# Patient Record
Sex: FEMALE | Race: WHITE | Employment: PART TIME | URBAN - NONMETROPOLITAN AREA
[De-identification: names, ages, dates, MRNs, and addresses within clinical notes are randomized per-mention and may not be internally consistent; named-entity substitution may affect disease eponyms.]

---
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
## 2022-01-14 ENCOUNTER — OFFICE VISIT (OUTPATIENT)
Dept: FAMILY MEDICINE CLINIC | Age: 22
End: 2022-01-14
Payer: MEDICARE

## 2022-01-14 VITALS
OXYGEN SATURATION: 99 % | WEIGHT: 104.4 LBS | RESPIRATION RATE: 14 BRPM | BODY MASS INDEX: 17.4 KG/M2 | DIASTOLIC BLOOD PRESSURE: 62 MMHG | HEART RATE: 90 BPM | TEMPERATURE: 97 F | SYSTOLIC BLOOD PRESSURE: 94 MMHG | HEIGHT: 65 IN

## 2022-01-14 DIAGNOSIS — Z11.4 ENCOUNTER FOR SCREENING FOR HIV: ICD-10-CM

## 2022-01-14 DIAGNOSIS — R59.0 ENLARGED LYMPH NODES IN ARMPIT: ICD-10-CM

## 2022-01-14 DIAGNOSIS — Z11.59 ENCOUNTER FOR HEPATITIS C SCREENING TEST FOR LOW RISK PATIENT: ICD-10-CM

## 2022-01-14 DIAGNOSIS — B35.1 ONYCHOMYCOSIS OF GREAT TOE: Primary | ICD-10-CM

## 2022-01-14 PROCEDURE — 1036F TOBACCO NON-USER: CPT | Performed by: STUDENT IN AN ORGANIZED HEALTH CARE EDUCATION/TRAINING PROGRAM

## 2022-01-14 PROCEDURE — G8419 CALC BMI OUT NRM PARAM NOF/U: HCPCS | Performed by: STUDENT IN AN ORGANIZED HEALTH CARE EDUCATION/TRAINING PROGRAM

## 2022-01-14 PROCEDURE — 99204 OFFICE O/P NEW MOD 45 MIN: CPT | Performed by: STUDENT IN AN ORGANIZED HEALTH CARE EDUCATION/TRAINING PROGRAM

## 2022-01-14 PROCEDURE — G8484 FLU IMMUNIZE NO ADMIN: HCPCS | Performed by: STUDENT IN AN ORGANIZED HEALTH CARE EDUCATION/TRAINING PROGRAM

## 2022-01-14 PROCEDURE — G8427 DOCREV CUR MEDS BY ELIG CLIN: HCPCS | Performed by: STUDENT IN AN ORGANIZED HEALTH CARE EDUCATION/TRAINING PROGRAM

## 2022-01-14 RX ORDER — TERBINAFINE HYDROCHLORIDE 250 MG/1
250 TABLET ORAL DAILY
Qty: 84 TABLET | Refills: 0 | Status: SHIPPED | OUTPATIENT
Start: 2022-01-14 | End: 2022-04-08

## 2022-01-14 SDOH — ECONOMIC STABILITY: FOOD INSECURITY: WITHIN THE PAST 12 MONTHS, THE FOOD YOU BOUGHT JUST DIDN'T LAST AND YOU DIDN'T HAVE MONEY TO GET MORE.: NEVER TRUE

## 2022-01-14 SDOH — ECONOMIC STABILITY: FOOD INSECURITY: WITHIN THE PAST 12 MONTHS, YOU WORRIED THAT YOUR FOOD WOULD RUN OUT BEFORE YOU GOT MONEY TO BUY MORE.: NEVER TRUE

## 2022-01-14 ASSESSMENT — PATIENT HEALTH QUESTIONNAIRE - PHQ9
SUM OF ALL RESPONSES TO PHQ QUESTIONS 1-9: 1
2. FEELING DOWN, DEPRESSED OR HOPELESS: 0
1. LITTLE INTEREST OR PLEASURE IN DOING THINGS: 1
SUM OF ALL RESPONSES TO PHQ9 QUESTIONS 1 & 2: 1
SUM OF ALL RESPONSES TO PHQ QUESTIONS 1-9: 1

## 2022-01-14 ASSESSMENT — SOCIAL DETERMINANTS OF HEALTH (SDOH): HOW HARD IS IT FOR YOU TO PAY FOR THE VERY BASICS LIKE FOOD, HOUSING, MEDICAL CARE, AND HEATING?: NOT VERY HARD

## 2022-01-14 ASSESSMENT — ENCOUNTER SYMPTOMS
DIARRHEA: 0
VOMITING: 0
ABDOMINAL PAIN: 0
WHEEZING: 0
SHORTNESS OF BREATH: 0
CONSTIPATION: 0
COUGH: 0
NAUSEA: 0
BACK PAIN: 0

## 2022-01-14 NOTE — PROGRESS NOTES
Attending attestation:  I personally performed and participated key or critical portions of the evaluation and management including personally performing the exam and medical decision making. I verify the accuracy of the documentation by the resident with the following addition or changes: Here for onychomycosis. Has not tried anything. Also had a transient breast lump that went away. Exam is normal today. Will get COVID-19 vaccine. Will get vaccine records for us. Will treat empirically with Lamisil.         Electronically signed by Bridget Cabrera MD on 1/14/2022 at 11:48 AM

## 2022-01-14 NOTE — PROGRESS NOTES
Sagrario Costello is a 25 y.o. female who presents today for:  Chief Complaint   Patient presents with    New Patient     establish         HPI:   Sagrario Costello is 25 y.o. who presents today to establish care. Wondering about the Covid vaccine. She has not gotten this, but is interested in doing so before upcoming trip. Reports lump in her right breast near arm pit several weeks ago. This has now resolved. Reports some caffeine intake daily. Family history of breast cancer in her great aunt only. She denies tenderness, skin changes, or a growing mass in her breast. Reports no further issues since this resolved. Bilateral big toes appear thickened and are not growing x5 months. She has not tried anything for this. Not improving or worsening. Wondering what can be done. Patient is unsure of childhood immunizations, she will work to get shot records. Reports occasional alcohol use and marijuana use. Has used acid in the past, but had a \"bad trip\" and has not used since this. Objective:     Vitals:    01/14/22 1048   BP: 94/62   Site: Left Upper Arm   Position: Sitting   Cuff Size: Medium Adult   Pulse: 90   Resp: 14   Temp: 97 °F (36.1 °C)   TempSrc: Skin   SpO2: 99%   Weight: 104 lb 6.4 oz (47.4 kg)   Height: 5' 5\" (1.651 m)       Wt Readings from Last 3 Encounters:   01/14/22 104 lb 6.4 oz (47.4 kg) (8 %, Z= -1.40)*   12/18/21 102 lb (46.3 kg) (6 %, Z= -1.60)*     * Growth percentiles are based on CDC (Girls, 2-20 Years) data.        BP Readings from Last 3 Encounters:   01/14/22 94/62   12/18/21 109/70       No results found for: WBC, HGB, HCT, MCV, PLT  No results found for: NA, K, CL, CO2, BUN, CREATININE, GLUCOSE, CALCIUM, PROT, LABALBU, BILITOT, ALKPHOS, AST, ALT, LABGLOM, GFRAA, AGRATIO, GLOB  No results found for: TSH, N0VNSQI, V6OFLYF, THYROIDAB, FT3, T4FREE  No results found for: LABA1C  No results found for: EAG  No results found for: CHOL  No results found for: TRIG  No results found for: HDL  No results found for: LDLCHOLESTEROL, LDLCALC    No results found for: LABMICR, [id number]    Review of Systems   Constitutional: Negative for activity change, chills, fatigue and fever. HENT: Negative for congestion. Eyes: Negative for visual disturbance. Respiratory: Negative for cough, shortness of breath and wheezing. Cardiovascular: Negative for chest pain and palpitations. Gastrointestinal: Negative for abdominal pain, constipation, diarrhea, nausea and vomiting. Genitourinary: Negative for dysuria and menstrual problem. Musculoskeletal: Negative for back pain. Skin:        Nail changes to bilateral big toes   Neurological: Negative for dizziness, syncope, light-headedness and headaches. Psychiatric/Behavioral: Negative for dysphoric mood. The patient is not nervous/anxious. Physical Exam  Vitals and nursing note reviewed. Constitutional:       Appearance: Normal appearance. She is normal weight. HENT:      Head: Normocephalic and atraumatic. Right Ear: Tympanic membrane and ear canal normal.      Left Ear: Tympanic membrane and ear canal normal.      Nose: Nose normal.      Mouth/Throat:      Mouth: Mucous membranes are moist.      Pharynx: No oropharyngeal exudate or posterior oropharyngeal erythema. Eyes:      Extraocular Movements: Extraocular movements intact. Conjunctiva/sclera: Conjunctivae normal.      Pupils: Pupils are equal, round, and reactive to light. Cardiovascular:      Rate and Rhythm: Normal rate and regular rhythm. Pulses: Normal pulses. Heart sounds: No murmur heard. Pulmonary:      Effort: Pulmonary effort is normal. No respiratory distress. Breath sounds: Normal breath sounds. No wheezing. Abdominal:      General: Abdomen is flat. Bowel sounds are normal. There is no distension. Palpations: Abdomen is soft. There is no mass. Tenderness: There is no abdominal tenderness.    Musculoskeletal:      Cervical back: Neck supple. Skin:     General: Skin is warm and dry. Comments: Bilateral great toes appear thickened, consistent with onchomycosis   Neurological:      General: No focal deficit present. Mental Status: She is alert and oriented to person, place, and time. Psychiatric:         Mood and Affect: Mood normal.         Behavior: Behavior normal.      Comments: Somewhat guarded affect           There is no immunization history on file for this patient. Health Maintenance Due   Topic Date Due    Hepatitis C screen  Never done    Varicella vaccine (1 of 2 - 2-dose childhood series) Never done    COVID-19 Vaccine (1) Never done    HPV vaccine (1 - 2-dose series) Never done    Depression Screen  Never done    HIV screen  Never done    Chlamydia screen  Never done    Flu vaccine (1) Never done    DTaP/Tdap/Td vaccine (1 - Tdap) Never done          Food Insecurity: No Food Insecurity    Worried About Running Out of Food in the Last Year: Never true    Ran Out of Food in the Last Year: Never true       Assessment / Plan:   1. Onychomycosis of great toe, bilateral  Will treat with oral terbinafine. Discussed checking LFTs after 6 weeks. - Hepatic Function Panel; Future  - terbinafine (LAMISIL) 250 MG tablet; Take 1 tablet by mouth daily  Dispense: 84 tablet; Refill: 0    2. Enlarged lymph nodes in armpit  Most likely related to now resolved lesion patient felt on lateral breast/axilla. Now resolved. No abnormal findings on exam.  - Continue to monitor, consider US if reappears in future    3. Encounter for screening for HIV  - HIV-1 and HIV-2 Antibodies; Future    4. Encounter for hepatitis C screening test for low risk patient  - Hepatitis C Antibody; Future         Return in about 6 months (around 7/14/2022).           Medications Prescribed:  Orders Placed This Encounter   Medications    terbinafine (LAMISIL) 250 MG tablet     Sig: Take 1 tablet by mouth daily     Dispense:  84 tablet Add 52 Modifier (Optional): no Refill:  0       No future appointments. Patient given educational materials - see patient instructions. Discussed use, benefit, and sideeffects of prescribed medications. All patient questions answered. Pt voiced understanding. Reviewed health maintenance. Instructed to continue current medications, diet and exercise. Patient agreed with treatment plan. Follow up as directed.      Electronically signed by Tamar Powell MD on 1/14/2022 at 12:12 PM Duration Of Freeze Thaw-Cycle (Seconds): 5 Detail Level: Simple Bill Insurance (You Assume Risk Of Denial Or Audit By Selecting Yes): Yes Post-Care Instructions: I reviewed with the patient in detail post-care instructions. Patient is to wear sunprotection, and avoid picking at any of the treated lesions. Pt may apply Vaseline to crusted or scabbing areas. Medical Necessity Clause: This procedure was medically necessary because the lesions that were treated were: Number Of Freeze-Thaw Cycles: 2 freeze-thaw cycles Medical Necessity Information: It is in your best interest to select a reason for this procedure from the list below. All of these items fulfill various CMS LCD requirements except the new and changing color options. Consent: The patient's verbal consent was obtained including but not limited to risks of crusting, scabbing, blistering, scarring, darker or lighter pigmentary change, recurrence, incomplete removal and infection.

## 2022-01-14 NOTE — PROGRESS NOTES
Health Maintenance Due   Topic Date Due    Hepatitis C screen  Never done    Varicella vaccine (1 of 2 - 2-dose childhood series) Never done    COVID-19 Vaccine (1) Never done    HPV vaccine (1 - 2-dose series) Never done    Depression Screen  Never done    HIV screen  Never done    Chlamydia screen  Never done    Flu vaccine (1) Never done    DTaP/Tdap/Td vaccine (1 - Tdap) Never done

## 2022-03-14 ENCOUNTER — OFFICE VISIT (OUTPATIENT)
Dept: FAMILY MEDICINE CLINIC | Age: 22
End: 2022-03-14
Payer: MEDICARE

## 2022-03-14 VITALS
DIASTOLIC BLOOD PRESSURE: 60 MMHG | OXYGEN SATURATION: 99 % | BODY MASS INDEX: 16.69 KG/M2 | HEART RATE: 96 BPM | RESPIRATION RATE: 18 BRPM | SYSTOLIC BLOOD PRESSURE: 100 MMHG | WEIGHT: 100.2 LBS | HEIGHT: 65 IN | TEMPERATURE: 96.9 F

## 2022-03-14 DIAGNOSIS — L70.0 ACNE VULGARIS: Primary | ICD-10-CM

## 2022-03-14 DIAGNOSIS — B35.1 ONYCHOMYCOSIS OF GREAT TOE: ICD-10-CM

## 2022-03-14 PROCEDURE — G8427 DOCREV CUR MEDS BY ELIG CLIN: HCPCS | Performed by: STUDENT IN AN ORGANIZED HEALTH CARE EDUCATION/TRAINING PROGRAM

## 2022-03-14 PROCEDURE — G8484 FLU IMMUNIZE NO ADMIN: HCPCS | Performed by: STUDENT IN AN ORGANIZED HEALTH CARE EDUCATION/TRAINING PROGRAM

## 2022-03-14 PROCEDURE — 99214 OFFICE O/P EST MOD 30 MIN: CPT | Performed by: STUDENT IN AN ORGANIZED HEALTH CARE EDUCATION/TRAINING PROGRAM

## 2022-03-14 PROCEDURE — G8419 CALC BMI OUT NRM PARAM NOF/U: HCPCS | Performed by: STUDENT IN AN ORGANIZED HEALTH CARE EDUCATION/TRAINING PROGRAM

## 2022-03-14 PROCEDURE — 1036F TOBACCO NON-USER: CPT | Performed by: STUDENT IN AN ORGANIZED HEALTH CARE EDUCATION/TRAINING PROGRAM

## 2022-03-14 RX ORDER — CLINDAMYCIN PHOSPHATE 10 MG/G
GEL TOPICAL
Qty: 60 G | Refills: 2 | Status: SHIPPED | OUTPATIENT
Start: 2022-03-14 | End: 2022-03-21

## 2022-03-14 RX ORDER — METRONIDAZOLE 7.5 MG/G
GEL TOPICAL
Qty: 1 EACH | Refills: 1 | Status: CANCELLED | OUTPATIENT
Start: 2022-03-14

## 2022-03-14 ASSESSMENT — ENCOUNTER SYMPTOMS
SHORTNESS OF BREATH: 0
COUGH: 0
BACK PAIN: 0
DIARRHEA: 0
WHEEZING: 0
VOMITING: 0
NAUSEA: 0
CONSTIPATION: 0
ABDOMINAL PAIN: 0

## 2022-03-14 NOTE — PROGRESS NOTES
Victoria Powell is a 25 y.o. female who presents today for:  Chief Complaint   Patient presents with    Referral - General     wants derm referral         HPI:   Victoria Powell is 25 y.o. who presents today for 2 month follow-up. She was diagnosed with onychomycosis of toenails. She has been taking Lamisil intermittently, but is planning to start taking this consistently again. She has not noticed any side effects. She does have LFTs ordered, but has not yet been able to have these drawn. She presents today for concern of acne. Worsening breakouts over the last few months. She is using OTC washes for this and topical Retinol gel without noticing much improvement. She does have scattered breakouts throughout her face and some on her back occasionally. She was treated with a topical ointment several years ago but does not recall what she used. Breakouts do not seem to worsen around her periods. She is wondering what else she can use for this. Objective:     Vitals:    03/14/22 0814   BP: 100/60   Site: Right Upper Arm   Position: Sitting   Cuff Size: Medium Adult   Pulse: 96   Resp: 18   Temp: 96.9 °F (36.1 °C)   TempSrc: Skin   SpO2: 99%   Weight: 100 lb 3.2 oz (45.5 kg)   Height: 5' 5\" (1.651 m)       Wt Readings from Last 3 Encounters:   03/14/22 100 lb 3.2 oz (45.5 kg) (4 %, Z= -1.78)*   01/14/22 104 lb 6.4 oz (47.4 kg) (8 %, Z= -1.40)*   12/18/21 102 lb (46.3 kg) (6 %, Z= -1.60)*     * Growth percentiles are based on CDC (Girls, 2-20 Years) data.        BP Readings from Last 3 Encounters:   03/14/22 100/60   01/14/22 94/62   12/18/21 109/70       No results found for: WBC, HGB, HCT, MCV, PLT  No results found for: NA, K, CL, CO2, BUN, CREATININE, GLUCOSE, CALCIUM, PROT, LABALBU, BILITOT, ALKPHOS, AST, ALT, LABGLOM, GFRAA, AGRATIO, GLOB  No results found for: TSH, T8LURUK, M7PRVIR, THYROIDAB, FT3, T4FREE  No results found for: LABA1C  No results found for: EAG  No results found for: CHOL  No results found for: TRIG  No results found for: HDL  No results found for: LDLCHOLESTEROL, LDLCALC    No results found for: LABMICR, [id number]    Review of Systems   Constitutional: Negative for chills, fatigue and fever. HENT: Negative for congestion. Eyes: Negative for visual disturbance. Respiratory: Negative for cough, shortness of breath and wheezing. Cardiovascular: Negative for chest pain and palpitations. Gastrointestinal: Negative for abdominal pain, constipation, diarrhea, nausea and vomiting. Musculoskeletal: Negative for back pain. Skin:        Bilateral thickened toenails  Reports Acne to face and back   Neurological: Negative for dizziness, syncope, light-headedness and headaches. Psychiatric/Behavioral: Negative for dysphoric mood. The patient is not nervous/anxious. Physical Exam  Vitals and nursing note reviewed. Constitutional:       Appearance: Normal appearance. She is normal weight. HENT:      Head: Normocephalic and atraumatic. Mouth/Throat:      Mouth: Mucous membranes are moist.      Pharynx: No oropharyngeal exudate or posterior oropharyngeal erythema. Eyes:      Extraocular Movements: Extraocular movements intact. Conjunctiva/sclera: Conjunctivae normal.      Pupils: Pupils are equal, round, and reactive to light. Cardiovascular:      Rate and Rhythm: Normal rate and regular rhythm. Pulses: Normal pulses. Heart sounds: No murmur heard. Pulmonary:      Effort: Pulmonary effort is normal. No respiratory distress. Breath sounds: Normal breath sounds. No wheezing. Abdominal:      General: Abdomen is flat. Bowel sounds are normal. There is no distension. Palpations: Abdomen is soft. There is no mass. Tenderness: There is no abdominal tenderness. Musculoskeletal:      Cervical back: Neck supple. Skin:     General: Skin is warm and dry. Findings: Acne present. Comments:  There are scattered erythematous papules noted across face - especially over bilateral cheeks and forehead. Skin appears mildly oily. Minimal scarring is present on bilateral cheeks from previous acne. No nodular or cystic lesions present. Very few scattered comedones noted across back   Neurological:      General: No focal deficit present. Mental Status: She is alert and oriented to person, place, and time. Psychiatric:         Mood and Affect: Mood normal.         Behavior: Behavior normal.           There is no immunization history on file for this patient. Health Maintenance Due   Topic Date Due    Hepatitis C screen  Never done    Varicella vaccine (2 of 2 - 2-dose childhood series) 10/28/2006    COVID-19 Vaccine (1) Never done    HIV screen  Never done    Chlamydia screen  Never done    Flu vaccine (1) Never done          Food Insecurity: No Food Insecurity    Worried About Running Out of Food in the Last Year: Never true    Ran Out of Food in the Last Year: Never true       Assessment / Plan:   1. Acne vulgaris  Mild-Moderate in severity.  - Encouraged patient to begin using Benzoyl Peroxide face wash and moisturizer OTC - start with daily, increase to twice daily as irritation decreased  - Start topical Clindamycin gel  - Follow-up in 2 months  - Consider referral to dermatology in future per patient's wishes  - clindamycin (CLEOCIN-T) 1 % gel; Apply topically 2 times daily. Dispense: 60 g; Refill: 2    2. Onychomycosis of great toe  Patient continues to undergo treatment with Lamisil - continue treatment  - Encouraged patient to have LFTs drawn prior to next visit         Return in about 2 months (around 5/14/2022). Medications Prescribed:  Orders Placed This Encounter   Medications    clindamycin (CLEOCIN-T) 1 % gel     Sig: Apply topically 2 times daily.      Dispense:  60 g     Refill:  2       Future Appointments   Date Time Provider Raheel Mejia   5/18/2022  1:00 PM Tamar Powell MD [facility] [address] [facility] Patient given educational materials - see patient instructions. Discussed use, benefit, and sideeffects of prescribed medications. All patient questions answered. Pt voiced understanding. Reviewed health maintenance. Instructed to continue current medications, diet and exercise. Patient agreed with treatment plan. Follow up as directed.      Electronically signed by Pio Jj MD on 3/14/2022 at 9:12 AM

## 2022-03-14 NOTE — PROGRESS NOTES
S: 25 y.o. female with   Chief Complaint   Patient presents with    Referral - General     wants derm referral     Acne treatment desires. She is having flare ups that are not responding to otc meds tried. Currently on Retinol OTC. Has had it for years. No hormonal effects/cycling. Not sure of triggers. Mild scarring but not pustular in nature. Great toe antifungal treatment ongoing. BP Readings from Last 3 Encounters:   03/14/22 100/60   01/14/22 94/62   12/18/21 109/70     Wt Readings from Last 3 Encounters:   03/14/22 100 lb 3.2 oz (45.5 kg) (4 %, Z= -1.78)*   01/14/22 104 lb 6.4 oz (47.4 kg) (8 %, Z= -1.40)*   12/18/21 102 lb (46.3 kg) (6 %, Z= -1.60)*     * Growth percentiles are based on [facility] (Girls, 2-20 Years) data. O: VS:   Vitals:    03/14/22 0814   BP: 100/60   Site: Right Upper Arm   Position: Sitting   Cuff Size: Medium Adult   Pulse: 96   Resp: 18   Temp: 96.9 °F (36.1 °C)   TempSrc: Skin   SpO2: 99%   Weight: 100 lb 3.2 oz (45.5 kg)   Height: 5' 5\" (1.651 m)     Body mass index is 16.67 kg/m². AAO/NAD, appropriate affect for mood  Normocephalic, atraumatic, eyes  conjunctiva and sclera normal,   Insight, judgement normal and in no acute distress  Facial skin with lesions on forehead and cheeks, mild erythematous lesions and comedones. No results found for: WBC, HGB, HCT, PLT, CHOL, TRIG, HDL, LDLDIRECT, LDLCALC, LDL, AST, NA, K, CL, CREATININE, BUN, CO2, TSH, PSA, INR, GLUF, LABA1C, LABMICR, LABGLOM, MG, CALCIUM, VITD25    No results found. Diagnosis Orders   1. Acne vulgaris  clindamycin (CLEOCIN-T) 1 % gel   2. Onychomycosis of great toe         Plan    Recommend daily gentle facial cleanser (salacylic acid 2%) and then apply a gentle, non-comedogenic facial moisturizer. Then will add prescription with topical antibiotic (clindamycin). Consider spot treatment with Adapalene or Retin-A if needed. Return in about 2 months (around 5/14/2022).     Orders Placed:  No orders of the defined types were placed in this encounter. Medications Prescribed:  No orders of the defined types were placed in this encounter. No future appointments. Health Maintenance Due   Topic Date Due    Hepatitis C screen  Never done    Varicella vaccine (2 of 2 - 2-dose childhood series) 10/28/2006    COVID-19 Vaccine (1) Never done    HIV screen  Never done    Chlamydia screen  Never done    Flu vaccine (1) Never done         Attending Physician Statement  I have discussed the case, including pertinent history and exam findings with the resident. I also have seen the patient and performed key portions of the examination. I agree with the documented assessment and plan as documented by the resident.   GE modifier added to this encounter      Murtaza Sosa MD 3/14/2022 8:42 AM

## 2022-05-20 ENCOUNTER — HOSPITAL ENCOUNTER (EMERGENCY)
Age: 22
Discharge: HOME | End: 2022-05-20
Payer: COMMERCIAL

## 2022-05-20 VITALS
HEIGHT: 65 IN | RESPIRATION RATE: 16 BRPM | HEART RATE: 92 BPM | SYSTOLIC BLOOD PRESSURE: 141 MMHG | DIASTOLIC BLOOD PRESSURE: 57 MMHG | OXYGEN SATURATION: 99 % | TEMPERATURE: 99.5 F | BODY MASS INDEX: 16.66 KG/M2 | WEIGHT: 100 LBS

## 2022-05-20 DIAGNOSIS — N30.01 ACUTE CYSTITIS WITH HEMATURIA: Primary | ICD-10-CM

## 2022-05-20 LAB
BILIRUBIN URINE: ABNORMAL
BLOOD, URINE: ABNORMAL
CHARACTER, URINE: ABNORMAL
COLOR: ABNORMAL
GLUCOSE URINE: NEGATIVE MG/DL
KETONES, URINE: ABNORMAL
LEUKOCYTE ESTERASE, URINE: ABNORMAL
NITRITE, URINE: NEGATIVE
PH UA: 5.5 (ref 5–9)
PREGNANCY, URINE: NEGATIVE
PROTEIN UA: >= 300 MG/DL
SPECIFIC GRAVITY UA: >= 1.03 (ref 1–1.03)
UROBILINOGEN, URINE: 0.2 EU/DL (ref 0.2–1)

## 2022-05-20 PROCEDURE — 84703 CHORIONIC GONADOTROPIN ASSAY: CPT

## 2022-05-20 PROCEDURE — 87205 SMEAR GRAM STAIN: CPT

## 2022-05-20 PROCEDURE — 99213 OFFICE O/P EST LOW 20 MIN: CPT | Performed by: NURSE PRACTITIONER

## 2022-05-20 PROCEDURE — 81003 URINALYSIS AUTO W/O SCOPE: CPT

## 2022-05-20 PROCEDURE — 87070 CULTURE OTHR SPECIMN AEROBIC: CPT

## 2022-05-20 PROCEDURE — 87491 CHLMYD TRACH DNA AMP PROBE: CPT

## 2022-05-20 PROCEDURE — 99213 OFFICE O/P EST LOW 20 MIN: CPT

## 2022-05-20 PROCEDURE — 87591 N.GONORRHOEAE DNA AMP PROB: CPT

## 2022-05-20 RX ORDER — SULFAMETHOXAZOLE AND TRIMETHOPRIM 800; 160 MG/1; MG/1
1 TABLET ORAL 2 TIMES DAILY
Qty: 6 TABLET | Refills: 0 | Status: SHIPPED | OUTPATIENT
Start: 2022-05-20 | End: 2022-05-23

## 2022-05-20 ASSESSMENT — ENCOUNTER SYMPTOMS
COUGH: 0
ABDOMINAL PAIN: 0
NAUSEA: 0
DIARRHEA: 0
VOMITING: 0

## 2022-05-20 ASSESSMENT — PAIN - FUNCTIONAL ASSESSMENT: PAIN_FUNCTIONAL_ASSESSMENT: 0-10

## 2022-05-20 ASSESSMENT — PAIN DESCRIPTION - PAIN TYPE: TYPE: ACUTE PAIN

## 2022-05-20 ASSESSMENT — PAIN SCALES - GENERAL: PAINLEVEL_OUTOF10: 2

## 2022-05-20 ASSESSMENT — PAIN DESCRIPTION - LOCATION: LOCATION: VAGINA

## 2022-05-20 NOTE — ED NOTES
Pt complains that this am she began to have painful and frequent urination along with blood noted in the urine. Denies vaginal discharge or odor, but states she does have a new sexual partner.       Ouida Paget, RN  05/20/22 5970

## 2022-05-20 NOTE — ED PROVIDER NOTES
[id number]       Chief Complaint   Patient presents with    Dysuria    Hematuria    Urinary Frequency       Nurses Notes reviewed and I agree except as noted in the HPI. HISTORY OF PRESENT ILLNESS   Ankur Estes is a 25 y.o. female who presents with complaints of painful urination and urinary frequency. States she did notice some blood as well. Symptoms began this morning and have gotten worse throughout the day. States she is concerned she may have a urinary tract infection. Denies abdominal pain, nausea, vomiting, diarrhea. No fever. No back pain. Denies vaginal pain or discharge. No pelvic pain. Patient states she would like tested for STDs because \"the guys I fuck are all problems. \"    REVIEW OF SYSTEMS     Review of Systems   Constitutional: Negative for fatigue and fever. Respiratory: Negative for cough. Cardiovascular: Negative for chest pain. Gastrointestinal: Negative for abdominal pain, diarrhea, nausea and vomiting. Genitourinary: Positive for dysuria, frequency, hematuria and urgency. Negative for decreased urine volume, flank pain, vaginal bleeding, vaginal discharge and vaginal pain. PAST MEDICAL HISTORY   History reviewed. No pertinent past medical history. SURGICAL HISTORY     Patient  has a past surgical history that includes Tonsillectomy. CURRENT MEDICATIONS       Previous Medications    No medications on file       ALLERGIES     Patient is is allergic to codeine. FAMILY HISTORY     Patient'sfamily history is not on file. SOCIAL HISTORY     Patient  reports that she has never smoked. She has never used smokeless tobacco. She reports current alcohol use. She reports current drug use. PHYSICAL EXAM     ED TRIAGE VITALS  BP: (!) 141/57, Temp: 99.5 °F (37.5 °C), Heart Rate: 92, Resp: 16, SpO2: 99 %  Physical Exam  Vitals and nursing note reviewed.    Constitutional:       General: She is awake. Appearance: Normal appearance. She is normal weight. HENT:      Head: Normocephalic and atraumatic. Cardiovascular:      Rate and Rhythm: Normal rate and regular rhythm. Pulses: Normal pulses. Heart sounds: Normal heart sounds. Pulmonary:      Effort: Pulmonary effort is normal.      Breath sounds: Normal breath sounds and air entry. Abdominal:      General: Abdomen is flat. Palpations: Abdomen is soft. Tenderness: There is no abdominal tenderness. Skin:     General: Skin is warm and dry. Neurological:      Mental Status: She is alert and oriented to person, place, and time. GCS: GCS eye subscore is 4. GCS verbal subscore is 5. GCS motor subscore is 6. Psychiatric:         Behavior: Behavior is cooperative. DIAGNOSTIC RESULTS   Labs:   Results for orders placed or performed during the hospital encounter of 05/20/22   Urinalysis   Result Value Ref Range    Glucose, Ur Negative NEGATIVE mg/dl    Bilirubin Urine Small (A) NEGATIVE    Ketones, Urine Trace (A) NEGATIVE    Specific Gravity, UA >=1.030 1.002 - 1.030    Blood, Urine Large (A) NEGATIVE    pH, UA 5.50 5.0 - 9.0    Protein, UA >= 300 (A) NEGATIVE mg/dl    Urobilinogen, Urine 0.20 0.2 - 1.0 eu/dl    Nitrite, Urine Negative NEGATIVE    Leukocyte Esterase, Urine Small (A) NEGATIVE    Color, UA Dark yellow (A) STRAW-YELLOW    Character, Urine Slightly Cloudy CLEAR-SL CLOUD   Pregnancy, Urine   Result Value Ref Range    Pregnancy, Urine NEGATIVE NEGATIVE       IMAGING:  No orders to display     URGENT CARE COURSE:     Vitals:    05/20/22 1548   BP: (!) 141/57   Pulse: 92   Resp: 16   Temp: 99.5 °F (37.5 °C)   SpO2: 99%   Weight: 100 lb (45.4 kg)   Height: 5' 5\" (1.651 m)       Medications - No data to display  PROCEDURES:  None  FINALIMPRESSION      1. Acute cystitis with hematuria        DISPOSITION/PLAN   DISPOSITION      Patient will be discharged home. Urine results were reviewed.   Discussed option of treating for possible STD at this time. Patient declines stating she would prefer to wait for final results. Take the antibiotic as prescribed for suspected UTI. Increase fluids and rest.  Proper hygiene reinforced. Avoid intercourse until all STD results have been received. Patient is encouraged to practice safe sex. Reviewed signs and symptoms that require immediate emergency room evaluation and treatment. PATIENT REFERRED TO:  [address] [address],[address]  [address] [place] [address]  Schedule an appointment as soon as possible for a visit in 2 days  As needed, If symptoms worsen go to emergency room    DISCHARGE MEDICATIONS:  New Prescriptions    SULFAMETHOXAZOLE-TRIMETHOPRIM (BACTRIM DS) 800-160 MG PER TABLET    Take 1 tablet by mouth 2 times daily for 3 days     Current Discharge Medication List          Deb Coley, [address] [place] [address], APRN - CNP  05/20/22 2187

## 2022-05-21 LAB
CHLAMYDIA TRACHOMATIS BY RT-PCR: DETECTED
CT/NG SOURCE: ABNORMAL
NEISSERIA GONORRHOEAE BY RT-PCR: NOT DETECTED

## 2022-05-23 LAB
GENITAL CULTURE, ROUTINE: ABNORMAL
GENITAL CULTURE, ROUTINE: ABNORMAL
GRAM STAIN RESULT: ABNORMAL
ORGANISM: ABNORMAL

## 2022-05-24 ENCOUNTER — OFFICE VISIT (OUTPATIENT)
Dept: FAMILY MEDICINE CLINIC | Age: 22
End: 2022-05-24
Payer: COMMERCIAL

## 2022-05-24 VITALS
WEIGHT: 107.4 LBS | TEMPERATURE: 97.9 F | HEIGHT: 65 IN | BODY MASS INDEX: 17.9 KG/M2 | OXYGEN SATURATION: 100 % | HEART RATE: 96 BPM | SYSTOLIC BLOOD PRESSURE: 122 MMHG | DIASTOLIC BLOOD PRESSURE: 76 MMHG | RESPIRATION RATE: 12 BRPM

## 2022-05-24 DIAGNOSIS — L70.0 ACNE VULGARIS: Primary | ICD-10-CM

## 2022-05-24 DIAGNOSIS — A74.9 CHLAMYDIA INFECTION: ICD-10-CM

## 2022-05-24 DIAGNOSIS — B35.1 ONYCHOMYCOSIS OF GREAT TOE: ICD-10-CM

## 2022-05-24 PROCEDURE — G8427 DOCREV CUR MEDS BY ELIG CLIN: HCPCS | Performed by: STUDENT IN AN ORGANIZED HEALTH CARE EDUCATION/TRAINING PROGRAM

## 2022-05-24 PROCEDURE — 1036F TOBACCO NON-USER: CPT | Performed by: STUDENT IN AN ORGANIZED HEALTH CARE EDUCATION/TRAINING PROGRAM

## 2022-05-24 PROCEDURE — G8419 CALC BMI OUT NRM PARAM NOF/U: HCPCS | Performed by: STUDENT IN AN ORGANIZED HEALTH CARE EDUCATION/TRAINING PROGRAM

## 2022-05-24 PROCEDURE — 99213 OFFICE O/P EST LOW 20 MIN: CPT | Performed by: STUDENT IN AN ORGANIZED HEALTH CARE EDUCATION/TRAINING PROGRAM

## 2022-05-24 RX ORDER — CLINDAMYCIN PHOSPHATE 10 MG/G
GEL TOPICAL
COMMUNITY
Start: 2022-04-10 | End: 2022-09-28 | Stop reason: ALTCHOICE

## 2022-05-24 ASSESSMENT — ENCOUNTER SYMPTOMS
DIARRHEA: 0
CONSTIPATION: 0
VOMITING: 0
BACK PAIN: 0
NAUSEA: 0
SHORTNESS OF BREATH: 0
COUGH: 0
ABDOMINAL PAIN: 0
WHEEZING: 0

## 2022-05-24 NOTE — PROGRESS NOTES
Health Maintenance Due   Topic Date Due    Varicella vaccine (2 of 2 - 2-dose childhood series) 10/28/2006    COVID-19 Vaccine (1) Never done    HIV screen  Never done ordered 01/04/2022    Hepatitis C screen  Never done ordered 01/04/2022

## 2022-05-24 NOTE — PROGRESS NOTES
Jacob Michelle is a 25 y.o. female who presents today for:  Chief Complaint   Patient presents with    Follow-up     2 Month Follow-up and still having issues with bilateral greater toes         HPI:   Jacob Michelle is 25 y.o. who presents today for follow-up. She continues to have thickening of her bilateral great toes, but this is improving. She completed course of treatment with Lamisil as previously prescribed. She denies side effects with this. Her toenails are not bothering her, and they continue to grow out without problem. Acne: Using clindamycin gel once daily, which works when she is consistent but she has missed several doses due to travel. Patient continues to use facial cleansers, moisturizers over the counter. She is also using Differin gel once daily. Overall, acne has improved. No scarring, no painful lesions. No side effects of medications. Patient recently seen at [facility] for dysuria and urinary frequency. She was diagnosed with UTI, chlamydia, and yeast infection. Patient recently completed 3 day course of Bactrim for UTI. She is now taking medications to treat yeast and chlamydia infections, but does not recall the names of these. Reports having recurrent UTIs, which typically occur after intercourse. She has had 3-4 this year so far. Patient reports symptoms typically start within 1-2 days of intercourse. She had received antibiotics for this, and has not previously required hospitalization.       Objective:     Vitals:    05/24/22 1453   BP: 122/76   Site: Right Upper Arm   Position: Sitting   Cuff Size: Medium Adult   Pulse: 96   Resp: 12   Temp: 97.9 °F (36.6 °C)   TempSrc: Temporal   SpO2: 100%   Weight: 107 lb 6.4 oz (48.7 kg)   Height: 5' 5\" (1.651 m)       Wt Readings from Last 3 Encounters:   05/24/22 107 lb 6.4 oz (48.7 kg) (11 %, Z= -1.20)*   05/20/22 100 lb (45.4 kg) (4 %, Z= -1.80)*   03/14/22 100 lb 3.2 oz (45.5 kg) (4 %, Z= -1.78)*     * Growth percentiles are based on CDC (Girls, 2-20 Years) data. BP Readings from Last 3 Encounters:   05/24/22 122/76   05/20/22 (!) 141/57   03/14/22 100/60       No results found for: WBC, HGB, HCT, MCV, PLT  No results found for: NA, K, CL, CO2, BUN, CREATININE, GLUCOSE, CALCIUM, PROT, LABALBU, BILITOT, ALKPHOS, AST, ALT, LABGLOM, GFRAA, AGRATIO, GLOB  No results found for: TSH, B7PTRRE, G3FSVKX, THYROIDAB, FT3, T4FREE  No results found for: LABA1C  No results found for: EAG  No results found for: CHOL  No results found for: TRIG  No results found for: HDL  No results found for: LDLCHOLESTEROL, LDLCALC    No results found for: LABMICR, [id number]    Review of Systems   Constitutional: Negative for activity change, chills, fatigue and fever. HENT: Negative for congestion. Eyes: Negative for visual disturbance. Respiratory: Negative for cough, shortness of breath and wheezing. Cardiovascular: Negative for chest pain and palpitations. Gastrointestinal: Negative for abdominal pain, constipation, diarrhea, nausea and vomiting. Genitourinary: Positive for dysuria and frequency. Negative for decreased urine volume, difficulty urinating, dyspareunia, vaginal bleeding, vaginal discharge and vaginal pain. Musculoskeletal: Negative for back pain. Skin:        Reports acne over bilateral cheeks; also thickened great toenails bilaterally   Neurological: Negative for dizziness, syncope, light-headedness and headaches. Psychiatric/Behavioral: Negative for dysphoric mood. The patient is not nervous/anxious. Physical Exam  Vitals and nursing note reviewed. Constitutional:       Appearance: Normal appearance. She is normal weight. HENT:      Head: Normocephalic and atraumatic.       Right Ear: Tympanic membrane and ear canal normal.      Left Ear: Tympanic membrane and ear canal normal.      Nose: Nose normal.      Mouth/Throat:      Mouth: Mucous membranes are moist.      Pharynx: No oropharyngeal exudate or posterior oropharyngeal erythema. Eyes:      Extraocular Movements: Extraocular movements intact. Conjunctiva/sclera: Conjunctivae normal.      Pupils: Pupils are equal, round, and reactive to light. Cardiovascular:      Rate and Rhythm: Normal rate and regular rhythm. Pulses: Normal pulses. Heart sounds: No murmur heard. Pulmonary:      Effort: Pulmonary effort is normal. No respiratory distress. Breath sounds: Normal breath sounds. No wheezing. Abdominal:      General: Abdomen is flat. Bowel sounds are normal. There is no distension. Palpations: Abdomen is soft. There is no mass. Tenderness: There is no abdominal tenderness. Musculoskeletal:      Cervical back: Neck supple. Skin:     General: Skin is warm and dry. Comments: Several small papules noted over bilateral cheeks, consistent with acne. No pustular or nodular lesions. No scarring noted. Bilateral great toenails also appear thickened, but base of nailbed shows new growth of nails with normal thickness   Neurological:      General: No focal deficit present. Mental Status: She is alert and oriented to person, place, and time.    Psychiatric:         Mood and Affect: Mood normal.         Behavior: Behavior normal.         Immunization History   Administered Date(s) Administered    DTaP (Infanrix) 2002, 02/20/2003, 04/28/2003, 01/20/2004, 03/07/2008    HPV 9-valent Aliene Pilar) 07/25/2017    HPV Quadrivalent (Gardasil) 04/22/2015    Hepatitis A Ped/Adol (Havrix, Vaqta) 04/22/2015, 07/25/2017    Hepatitis B 2002, 2002, 04/28/2003    Hib (HbOC) 01/14/2003, 03/06/2003, 04/28/2003, 01/20/2004    MMR 11/06/2003, 03/07/2008    Meningococcal MCV4O (Menveo) 04/22/2015    Pneumococcal Conjugate 13-valent (Genette Guise) 02/20/2003, 11/06/2003, 01/20/2004, 11/11/2004    Polio IPV (IPOL) 01/14/2003, 03/06/2003, 04/28/2003, 03/17/2008    Tdap (Boostrix, Adacel) 04/22/2015    Varicella (Varivax) 11/06/2003 Health Maintenance Due   Topic Date Due    Varicella vaccine (2 of 2 - 2-dose childhood series) 10/28/2006    COVID-19 Vaccine (1) Never done    HIV screen  Never done    Hepatitis C screen  Never done          Food Insecurity: No Food Insecurity    Worried About Running Out of Food in the Last Year: Never true    Ran Out of Food in the Last Year: Never true       Assessment / Plan:   1. Acne vulgaris  Acne improving overall.  - Recommend continuing treatment with OTC cleansers, limit facial scrubs as this can worsen acne  - Increase Clindamycin gel to twice daily    2. Onychomycosis of great toe  Previously completed course of treatment with PO Lamisil. Toenails appear to be growing out currently with base of nails appearing normal, non-thickened  - Continue monitoring, no indication for further treatment currently    3. Chlamydia infection  - Continue antibiotic treatment as previously prescribed  - Discussed recurrent UTIs in the setting of new sexual partners - will monitor currently, may consider prophylactic antibiotics after intercourse if UTIs continue  - Discussed safe sex practices  - Patient encouraged to have HIV and Hepatitis C labs drawn as previously ordered         Return in about 4 months (around 9/24/2022). Medications Prescribed:  No orders of the defined types were placed in this encounter. Future Appointments   Date Time Provider Raheel Mejia   9/28/2022  1:40 PM [facility]low [facility]       Patient given educational materials - see patient instructions. Discussed use, benefit, and sideeffects of prescribed medications. All patient questions answered. Pt voiced understanding. Reviewed health maintenance. Instructed to continue current medications, diet and exercise. Patient agreed with treatment plan. Follow up as directed.      Electronically signed by Sonya Marlow MD on 5/24/2022 at 5:42 PM

## 2022-05-24 NOTE — PROGRESS NOTES
[id number] [facility] [address] [address]  Dept: [phone]  Loc: [phone]      Please see Resident HPI. ROS per Resident      Health Maintenance Due   Topic Date Due    Varicella vaccine (2 of 2 - 2-dose childhood series) 10/28/2006    COVID-19 Vaccine (1) Never done    HIV screen  Never done    Hepatitis C screen  Never done         Physical Exam per Resident       ICD-10-CM    1. Acne vulgaris  L70.0    2. Onychomycosis of great toe  B35.1    3. Chlamydia infection  A74.9        Plan  I participated in the discussion and care of this patient   Acne - Increase clinda to twice a day   Onychomycosis - resolving  Chlamydia - complete doxy previously prescribed    Counseling on safe sex, condoms, avoiding pregnancy until intended. Encourage prenatal vitamin.

## 2022-05-24 NOTE — PATIENT INSTRUCTIONS
Thank you   1. Thank you for trusting us with your healthcare needs. You may receive a survey regarding today's visit. It would help us out if you would take a few moments to provide your feedback. We value your input. 2. Please bring in ALL medications BOTTLES, including inhalers, herbal supplements, over the counter, prescribed & non-prescribed medicine. The office would like actual medication bottles and a list.   3. Please note our OFFICE POLICIES:   a. Prior to getting your labs drawn, please check with your insurance company for benefits and eligibility of lab services. Often, insurance companies cover certain tests for preventative visits only. It is patient's responsibility to see what is covered. b. We are unable to change a diagnosis after the test has been performed. c. Lab orders will not be re-printed. Please hold onto your original lab orders and take them to your lab to be completed. d. If you no show your scheduled appointment three times, you will be dismissed from this practice. e. Cro[place] must be completed 24 hours prior to your schedule appointment. 4. If the list below has been completed, PLEASE FAX RECORDS TO OUR OFFICE @ [phone].  Once the records have been received we will update your records at our office:  Health Maintenance Due   Topic Date Due    Varicella vaccine (2 of 2 - 2-dose childhood series) 10/28/2006    COVID-19 Vaccine (1) Never done    HIV screen  Never done    Hepatitis C screen  Never done

## 2022-05-24 NOTE — PROGRESS NOTES
Shelbi Nguyen is a 25 y. o.female    Chief Complaint   Patient presents with    Follow-up     2 Month Follow-up and still having issues with bilateral greater toes       Chief complaint, [place], and all pertinent details of the case reviewed with the resident. Please see resident's note for specific details discussed at today's visit. Here for recheck of acne. clindagel has helped significantly. Using it once daily. Also has onychomycosis, this is improving, growing out well. Recently seen in urgent care for vaginitis/UTI. Is taking meds currently for this. There is no problem list on file for this patient. Current Outpatient Medications   Medication Sig Dispense Refill    clindamycin (CLINDAGEL) 1 % gel APPLY THIN LAYER TOPICALLY TWICE DAILY       No current facility-administered medications for this visit. Review of Systems per Dr. Petros Phillips    OBJECTIVE     /76 (Site: Right Upper Arm, Position: Sitting, Cuff Size: Medium Adult)   Pulse 96   Temp 97.9 °F (36.6 °C) (Temporal)   Resp 12   Ht 5' 5\" (1.651 m)   Wt 107 lb 6.4 oz (48.7 kg)   LMP 05/06/2022   SpO2 100%   BMI 17.87 kg/m²   BP Readings from Last 3 Encounters:   05/24/22 122/76   05/20/22 (!) 141/57   03/14/22 100/60       Wt Readings from Last 3 Encounters:   05/24/22 107 lb 6.4 oz (48.7 kg) (11 %, Z= -1.20)*   05/20/22 100 lb (45.4 kg) (4 %, Z= -1.80)*   03/14/22 100 lb 3.2 oz (45.5 kg) (4 %, Z= -1.78)*     * Growth percentiles are based on CDC (Girls, 2-20 Years) data. Body mass index is 17.87 kg/m². Physical Exam per Dr. Petros Phillips    No results found for: LABA1C    No results found for: CHOL, TRIG, HDL, LDLCALC, LDLDIRECT    The ASCVD Risk score (Bradford Greenberg et al., 2013) failed to calculate for the following reasons:     The 2013 ASCVD risk score is only valid for ages 36 to 78    No results found for: NA, K, CL, CO2, BUN, CREATININE, GLUCOSE, CALCIUM, PROT, LABALBU, BILITOT, ALKPHOS, AST, ALT, LABGLOM, GFRAA, AGRATIO, GLOB    No results found for: TSH, A1PYRZF, J0KVQRN, THYROIDAB, FT3, T4FREE    No results found for: WBC, HGB, HCT, MCV, PLT      No future appointments. ASSESSMENT       Diagnosis Orders   1. Acne vulgaris     2. Onychomycosis of great toe     3. Chlamydia infection         PLAN      After discussion with Dr. Dominic Pereyra we agreed on plan as follows:    1.  increase clindagel to bid  2. Finish course of abx for vagiits/uti  3. Recommend contraception/ counseling on safe sex          Attending Physician Statement  I have discussed the case, including pertinent history and exam findings with the resident. I agree with the documented assessment and plan as documented by the resident.   GE modifier added  to this encounter        Electronically signed by Jackie Page MD on 5/24/2022 at 4:01 PM

## 2022-08-03 ENCOUNTER — HOSPITAL ENCOUNTER (EMERGENCY)
Age: 22
Discharge: HOME | End: 2022-08-03
Payer: COMMERCIAL

## 2022-08-03 VITALS
RESPIRATION RATE: 16 BRPM | OXYGEN SATURATION: 98 % | HEART RATE: 93 BPM | SYSTOLIC BLOOD PRESSURE: 112 MMHG | DIASTOLIC BLOOD PRESSURE: 72 MMHG | TEMPERATURE: 98.2 F

## 2022-08-03 DIAGNOSIS — N89.8 VAGINAL DISCHARGE: Primary | ICD-10-CM

## 2022-08-03 LAB
BILIRUBIN URINE: NEGATIVE
BLOOD, URINE: NEGATIVE
CHARACTER, URINE: CLEAR
COLOR: YELLOW
GLUCOSE URINE: NEGATIVE MG/DL
KETONES, URINE: NEGATIVE
LEUKOCYTE ESTERASE, URINE: ABNORMAL
NITRITE, URINE: NEGATIVE
PH UA: 6 (ref 5–9)
PREGNANCY, URINE: NEGATIVE
PROTEIN UA: NEGATIVE MG/DL
SPECIFIC GRAVITY UA: 1.02 (ref 1–1.03)
TRICHOMONAS PREP: NEGATIVE
UROBILINOGEN, URINE: 0.2 EU/DL (ref 0.2–1)

## 2022-08-03 PROCEDURE — 84703 CHORIONIC GONADOTROPIN ASSAY: CPT

## 2022-08-03 PROCEDURE — 87591 N.GONORRHOEAE DNA AMP PROB: CPT

## 2022-08-03 PROCEDURE — 81003 URINALYSIS AUTO W/O SCOPE: CPT

## 2022-08-03 PROCEDURE — 87205 SMEAR GRAM STAIN: CPT

## 2022-08-03 PROCEDURE — 87808 TRICHOMONAS ASSAY W/OPTIC: CPT

## 2022-08-03 PROCEDURE — 99213 OFFICE O/P EST LOW 20 MIN: CPT

## 2022-08-03 PROCEDURE — 87070 CULTURE OTHR SPECIMN AEROBIC: CPT

## 2022-08-03 PROCEDURE — 87491 CHLMYD TRACH DNA AMP PROBE: CPT

## 2022-08-03 PROCEDURE — 99213 OFFICE O/P EST LOW 20 MIN: CPT | Performed by: EMERGENCY MEDICINE

## 2022-08-03 ASSESSMENT — ENCOUNTER SYMPTOMS
COUGH: 0
VOMITING: 0
SHORTNESS OF BREATH: 0
DIARRHEA: 0
NAUSEA: 0
COLOR CHANGE: 0

## 2022-08-03 ASSESSMENT — PAIN - FUNCTIONAL ASSESSMENT: PAIN_FUNCTIONAL_ASSESSMENT: NONE - DENIES PAIN

## 2022-08-03 NOTE — ED PROVIDER NOTES
[facility]       CHIEF COMPLAINT       Chief Complaint   Patient presents with    Vaginal Discharge     No odor no itching wants std tested. Nurses Notes reviewed and I agree except as noted in the HPI. HISTORY OF PRESENT ILLNESS   Carmen Vera is a 25 y.o. female who presents for a slight increase in vaginal discharge. Patient states that she wants to be tested for STDs. She reports that she has had new partners recently but they always wear a condom. Patient denies any abnormal vaginal bleeding. States last menstrual period was approximately 2 weeks ago. No dysuria, frequency or urgency. No pelvic pain or dyspareunia. Denies any lesions to the vaginal area. HPI    REVIEW OF SYSTEMS     Review of Systems   Constitutional:  Negative for activity change, fatigue and fever. Respiratory:  Negative for cough and shortness of breath. Gastrointestinal:  Negative for diarrhea, nausea and vomiting. Genitourinary:  Positive for vaginal discharge. Negative for difficulty urinating, dysuria, frequency, genital sores, pelvic pain, vaginal bleeding and vaginal pain. Skin:  Negative for color change. Neurological:  Negative for dizziness and headaches. PAST MEDICAL HISTORY         Diagnosis Date    Chlamydia 05/2022       SURGICALHISTORY     Patient  has a past surgical history that includes Tonsillectomy. CURRENT MEDICATIONS       Discharge Medication List as of 8/3/2022  5:45 PM        CONTINUE these medications which have NOT CHANGED    Details   clindamycin (CLINDAGEL) 1 % gel APPLY THIN LAYER TOPICALLY TWICE DAILY, Historical Med             ALLERGIES     Patient is is allergic to codeine.     Patients   Immunization History   Administered Date(s) Administered    DTaP (Infanrix) 2002, 02/20/2003, 04/28/2003, 01/20/2004, 03/07/2008    HPV 9-valent Mauri Neely) 07/25/2017    HPV Quadrivalent (Gardasil) 04/22/2015    Hepatitis A Ped/Adol (Havrix, Vaqta) 04/22/2015, 07/25/2017    Hepatitis B 2002, 2002, 04/28/2003    Hib (HbOC) 01/14/2003, 03/06/2003, 04/28/2003, 01/20/2004    MMR 11/06/2003, 03/07/2008    Meningococcal MCV4O (Menveo) 04/22/2015    Pneumococcal Conjugate 13-valent (Carolyn Khat) 02/20/2003, 11/06/2003, 01/20/2004, 11/11/2004    Polio IPV (IPOL) 01/14/2003, 03/06/2003, 04/28/2003, 03/17/2008    Tdap (Boostrix, Adacel) 04/22/2015    Varicella (Varivax) 11/06/2003       FAMILY HISTORY     Patient's family history is not on file. SOCIAL HISTORY     Patient  reports that she has never smoked. She has never used smokeless tobacco. She reports current alcohol use. She reports current drug use. Drug: MDMA (Ecstacy). PHYSICAL EXAM     ED TRIAGE VITALS  BP: 112/72, Temp: 98.2 °F (36.8 °C), Heart Rate: 93, Resp: 16, SpO2: 98 %,Estimated body mass index is 17.87 kg/m² as calculated from the following:    Height as of 5/24/22: 5' 5\" (1.651 m). Weight as of 5/24/22: 107 lb 6.4 oz (48.7 kg). ,No LMP recorded. Physical Exam  Constitutional:       General: She is not in acute distress. Appearance: She is normal weight. She is not ill-appearing. Cardiovascular:      Rate and Rhythm: Normal rate and regular rhythm. Pulses: Normal pulses. Heart sounds: Normal heart sounds. Pulmonary:      Effort: Pulmonary effort is normal.      Breath sounds: Normal breath sounds. Abdominal:      General: Abdomen is flat. Bowel sounds are normal.      Palpations: Abdomen is soft. Skin:     General: Skin is warm and dry. Neurological:      General: No focal deficit present. Mental Status: She is alert and oriented to person, place, and time.    Psychiatric:         Mood and Affect: Mood normal.         Behavior: Behavior normal.       DIAGNOSTIC RESULTS     Labs:  Results for orders placed or performed during the hospital encounter of 08/03/22   Trichomonas Screen    Specimen: Vaginal   Result Value Ref Range Trichomonas Prep NEGATIVE NEGATIVE   Urinalysis   Result Value Ref Range    Glucose, Ur Negative NEGATIVE mg/dl    Bilirubin Urine Negative NEGATIVE    Ketones, Urine Negative NEGATIVE    Specific Gravity, UA 1.025 1.002 - 1.030    Blood, Urine Negative NEGATIVE    pH, UA 6.00 5.0 - 9.0    Protein, UA Negative NEGATIVE mg/dl    Urobilinogen, Urine 0.20 0.2 - 1.0 eu/dl    Nitrite, Urine Negative NEGATIVE    Leukocyte Esterase, Urine Small (A) NEGATIVE    Color, UA Yellow STRAW-YELLOW    Character, Urine Clear CLEAR-SL CLOUD   Pregnancy, Urine   Result Value Ref Range    Pregnancy, Urine NEGATIVE NEGATIVE       IMAGING:    No orders to display         EKG:      URGENT CARE COURSE:     Vitals:    08/03/22 1707   BP: 112/72   Pulse: 93   Resp: 16   Temp: 98.2 °F (36.8 °C)   TempSrc: Temporal   SpO2: 98%       Medications - No data to display         PROCEDURES:  None    FINAL IMPRESSION      1. Vaginal discharge          DISPOSITION/ PLAN   Presents for vaginal discharge complaint. Patient has no urinary complaints. Patient states she does not want treated for STIs unless she has positive results. Trichomonas result negative. Chlamydia and gonorrhea test in process. GC also in process. Advised we will contact if results are positive. I did recommend the patient to follow-up with health department for HIV and syphilis testing. PATIENT REFERRED TO:  No primary care provider on file. No primary physician on file.       DISCHARGE MEDICATIONS:  Discharge Medication List as of 8/3/2022  5:45 PM          Discharge Medication List as of 8/3/2022  5:45 PM          Discharge Medication List as of 8/3/2022  5:45 PM          MARISOL Mathew CNP    (Please note that portions of this note were completed with a voice recognition program. Efforts were made to edit the dictations but occasionally words are mis-transcribed.)           MARISOL Mathew CNP  08/03/22 6514

## 2022-08-03 NOTE — DISCHARGE INSTRUCTIONS
We will contact you if any results are positive and require treatment.     You may also get your results through [address]    I recommend you follow-up with the health department for syphilis and HIV testing

## 2022-08-04 LAB
CHLAMYDIA TRACHOMATIS BY RT-PCR: NOT DETECTED
CT/NG SOURCE: NORMAL
NEISSERIA GONORRHOEAE BY RT-PCR: NOT DETECTED

## 2022-09-28 ENCOUNTER — NURSE ONLY (OUTPATIENT)
Dept: LAB | Age: 22
End: 2022-09-28

## 2022-09-28 ENCOUNTER — OFFICE VISIT (OUTPATIENT)
Dept: FAMILY MEDICINE CLINIC | Age: 22
End: 2022-09-28
Payer: COMMERCIAL

## 2022-09-28 VITALS
TEMPERATURE: 98.4 F | HEIGHT: 65 IN | DIASTOLIC BLOOD PRESSURE: 60 MMHG | WEIGHT: 109 LBS | SYSTOLIC BLOOD PRESSURE: 110 MMHG | OXYGEN SATURATION: 99 % | HEART RATE: 100 BPM | RESPIRATION RATE: 12 BRPM | BODY MASS INDEX: 18.16 KG/M2

## 2022-09-28 DIAGNOSIS — Z11.3 ROUTINE SCREENING FOR STI (SEXUALLY TRANSMITTED INFECTION): ICD-10-CM

## 2022-09-28 DIAGNOSIS — B37.31 VAGINAL YEAST INFECTION: Primary | ICD-10-CM

## 2022-09-28 DIAGNOSIS — Z11.59 ENCOUNTER FOR HEPATITIS C VIRUS SCREENING TEST FOR HIGH RISK PATIENT: ICD-10-CM

## 2022-09-28 DIAGNOSIS — N39.0 RECURRENT UTI: ICD-10-CM

## 2022-09-28 DIAGNOSIS — Z91.89 ENCOUNTER FOR HEPATITIS C VIRUS SCREENING TEST FOR HIGH RISK PATIENT: ICD-10-CM

## 2022-09-28 DIAGNOSIS — Z11.4 ENCOUNTER FOR SCREENING FOR HIV: ICD-10-CM

## 2022-09-28 LAB — HEPATITIS C ANTIBODY: NEGATIVE

## 2022-09-28 PROCEDURE — G8427 DOCREV CUR MEDS BY ELIG CLIN: HCPCS | Performed by: STUDENT IN AN ORGANIZED HEALTH CARE EDUCATION/TRAINING PROGRAM

## 2022-09-28 PROCEDURE — G8419 CALC BMI OUT NRM PARAM NOF/U: HCPCS | Performed by: STUDENT IN AN ORGANIZED HEALTH CARE EDUCATION/TRAINING PROGRAM

## 2022-09-28 PROCEDURE — 99213 OFFICE O/P EST LOW 20 MIN: CPT | Performed by: STUDENT IN AN ORGANIZED HEALTH CARE EDUCATION/TRAINING PROGRAM

## 2022-09-28 PROCEDURE — 1036F TOBACCO NON-USER: CPT | Performed by: STUDENT IN AN ORGANIZED HEALTH CARE EDUCATION/TRAINING PROGRAM

## 2022-09-28 ASSESSMENT — ENCOUNTER SYMPTOMS
COUGH: 0
WHEEZING: 0
VOMITING: 0
SHORTNESS OF BREATH: 0
ABDOMINAL PAIN: 0
NAUSEA: 0
CONSTIPATION: 0
BACK PAIN: 0
DIARRHEA: 0

## 2022-09-28 NOTE — PROGRESS NOTES
Rabia Aggarwal is a 25 y.o. female who presents today for:  Chief Complaint   Patient presents with    Follow-up     4 Month Follow-up frequent UTI's and Yeast infections more common with Sex         HPI:   Rabia Aggarwal is 25 y.o. who presents today for follow-up. Patient has history of several recurrent UTIs, and has been treated at least 4 times so far this year. Patient also reports frequent yeast infections with thick white vaginal discharge. Symptoms began each time after having intercourse. She has tried using gentle unscented soaps, and urinating after intercourse without improvement of symptoms. Patient is wondering what else she is able to do for her dysuria and vaginal discharge. Today she has no symptoms. Reports her last time having intercourse was nearly a month ago. She was also seen in urgent care recently for symptoms. Testing for gonorrhea, chlamydia, and trichomonas were negative at that time. Culture grew Candida. Symptoms have resolved since that time. She is interested in further STI screening today. Currently she denies fever, chills, vaginal discharge, abnormal uterine bleeding, dysuria, urinary frequency at this time. Objective:     Vitals:    09/28/22 1343   BP: 110/60   Site: Left Upper Arm   Position: Sitting   Cuff Size: Medium Adult   Pulse: 100   Resp: 12   Temp: 98.4 °F (36.9 °C)   TempSrc: Oral   SpO2: 99%   Weight: 109 lb (49.4 kg)   Height: 5' 5\" (1.651 m)       Wt Readings from Last 3 Encounters:   09/28/22 109 lb (49.4 kg) (13 %, Z= -1.11)*   05/24/22 107 lb 6.4 oz (48.7 kg) (11 %, Z= -1.20)*   05/20/22 100 lb (45.4 kg) (4 %, Z= -1.80)*     * Growth percentiles are based on CDC (Girls, 2-20 Years) data.        BP Readings from Last 3 Encounters:   09/28/22 110/60   08/03/22 112/72   05/24/22 122/76       No results found for: WBC, HGB, HCT, MCV, PLT  No results found for: NA, K, CL, CO2, BUN, CREATININE, GLUCOSE, CALCIUM, PROT, LABALBU, BILITOT, ALKPHOS, AST, ALT, LABGLOM, GFRAA, AGRATIO, GLOB  No results found for: TSH, B9RESNV, V2VJMXB, THYROIDAB, FT3, T4FREE  No results found for: LABA1C  No results found for: EAG  No results found for: CHOL  No results found for: TRIG  No results found for: HDL  No results found for: LDLCHOLESTEROL, LDLCALC    No results found for: LABMICR, [id number]    Review of Systems   Constitutional:  Negative for activity change, chills, fatigue and fever. HENT:  Negative for congestion. Eyes:  Negative for visual disturbance. Respiratory:  Negative for cough, shortness of breath and wheezing. Cardiovascular:  Negative for chest pain and palpitations. Gastrointestinal:  Negative for abdominal pain, constipation, diarrhea, nausea and vomiting. Genitourinary:  Negative for dysuria, menstrual problem, pelvic pain, vaginal bleeding, vaginal discharge and vaginal pain. Musculoskeletal:  Negative for back pain. Neurological:  Negative for dizziness, syncope, light-headedness and headaches. Psychiatric/Behavioral:  Negative for dysphoric mood. The patient is not nervous/anxious. Physical Exam  Vitals and nursing note reviewed. Constitutional:       Appearance: Normal appearance. She is normal weight. HENT:      Head: Normocephalic and atraumatic. Right Ear: Tympanic membrane and ear canal normal.      Left Ear: Tympanic membrane and ear canal normal.      Nose: Nose normal.      Mouth/Throat:      Mouth: Mucous membranes are moist.      Pharynx: No oropharyngeal exudate or posterior oropharyngeal erythema. Eyes:      Extraocular Movements: Extraocular movements intact. Conjunctiva/sclera: Conjunctivae normal.      Pupils: Pupils are equal, round, and reactive to light. Cardiovascular:      Rate and Rhythm: Normal rate and regular rhythm. Pulses: Normal pulses. Heart sounds: No murmur heard. Pulmonary:      Effort: Pulmonary effort is normal. No respiratory distress.       Breath sounds: Normal breath sounds. No wheezing. Abdominal:      General: Abdomen is flat. Bowel sounds are normal. There is no distension. Palpations: Abdomen is soft. There is no mass. Tenderness: There is no abdominal tenderness. Musculoskeletal:      Cervical back: Neck supple. Skin:     General: Skin is warm and dry. Neurological:      General: No focal deficit present. Mental Status: She is alert and oriented to person, place, and time. Psychiatric:         Mood and Affect: Mood normal.         Behavior: Behavior normal.       Immunization History   Administered Date(s) Administered    DTaP (Infanrix) 2002, 02/20/2003, 04/28/2003, 01/20/2004, 03/07/2008    HPV 9-valent Victorine Mare) 07/25/2017    HPV Quadrivalent (Gardasil) 04/22/2015    Hepatitis A Ped/Adol (Havrix, Vaqta) 04/22/2015, 07/25/2017    Hepatitis B 2002, 2002, 04/28/2003    Hib (HbOC) 01/14/2003, 03/06/2003, 04/28/2003, 01/20/2004    MMR 11/06/2003, 03/07/2008    Meningococcal MCV4O (Menveo) 04/22/2015    Pneumococcal Conjugate 13-valent (Tomy Neth) 02/20/2003, 11/06/2003, 01/20/2004, 11/11/2004    Polio IPV (IPOL) 01/14/2003, 03/06/2003, 04/28/2003, 03/17/2008    Tdap (Boostrix, Adacel) 04/22/2015    Varicella (Varivax) 11/06/2003       Health Maintenance Due   Topic Date Due    COVID-19 Vaccine (1) Never done    Varicella vaccine (2 of 2 - 2-dose childhood series) 10/28/2006    HIV screen  Never done    Hepatitis C screen  Never done    Flu vaccine (1) Never done       Food Insecurity: No Food Insecurity    Worried About Running Out of Food in the Last Year: Never true    Ran Out of Food in the Last Year: Never true       Assessment / Plan:   1. Vaginal yeast infection  2. Recurrent UTI  Patient has a history of recurrent yeast infections and UTIs.   Currently she has no symptoms, therefore will hold off on any treatment currently.  -Discussed scheduling follow-up in office next time symptoms of dysuria or vaginal discharge

## 2022-09-28 NOTE — PROGRESS NOTES
Health Maintenance Due   Topic Date Due    COVID-19 Vaccine (1) Never done    Varicella vaccine (2 of 2 - 2-dose childhood series) 10/28/2006    HIV screen  Never done    Hepatitis C screen  Never done    Flu vaccine (1) Never done

## 2022-09-29 ENCOUNTER — TELEPHONE (OUTPATIENT)
Dept: FAMILY MEDICINE CLINIC | Age: 22
End: 2022-09-29

## 2022-09-29 LAB
HIV AG/AB: NONREACTIVE
RPR: NONREACTIVE

## 2022-09-29 NOTE — TELEPHONE ENCOUNTER
----- Message from Christian Esqueda MD sent at 9/29/2022  2:36 PM EDT -----  Melisa Chavarria,    Your screening labs all came back normal. Thank you!

## 2022-10-03 ENCOUNTER — TELEPHONE (OUTPATIENT)
Dept: FAMILY MEDICINE CLINIC | Age: 22
End: 2022-10-03

## 2022-10-03 NOTE — TELEPHONE ENCOUNTER
----- Message from Marshal Rasmussen sent at 10/3/2022 10:20 AM EDT -----  Subject: Appointment Request    Reason for Call: New Patient/New to Provider Appointment needed: Urgent   Adult Urinary Problem    QUESTIONS    Reason for appointment request? Other - Pt says she is already established     Additional Information for Provider? Pt calling in b/c she was told by PCP   that if she starts to experience UTI symptoms again to call and get an   appt. Pt has no PCP listed but says she has been seeing [facility]. Upon trying to schedule b/c PCP not listed it would only offer appt as if   she is a new pt.  She would like someone from office to please call her   back asap about this   ---------------------------------------------------------------------------  --------------  [address] [employer]  [phone]; OK to leave message on voicemail  ---------------------------------------------------------------------------  --------------  SCRIPT ANSWERS  COVID Screen: Mellissa Arenas

## 2022-10-05 ENCOUNTER — OFFICE VISIT (OUTPATIENT)
Dept: FAMILY MEDICINE CLINIC | Age: 22
End: 2022-10-05
Payer: COMMERCIAL

## 2022-10-05 VITALS
TEMPERATURE: 97.7 F | BODY MASS INDEX: 18.16 KG/M2 | RESPIRATION RATE: 10 BRPM | HEIGHT: 65 IN | WEIGHT: 109 LBS | SYSTOLIC BLOOD PRESSURE: 98 MMHG | DIASTOLIC BLOOD PRESSURE: 66 MMHG | HEART RATE: 71 BPM | OXYGEN SATURATION: 98 %

## 2022-10-05 DIAGNOSIS — Z11.3 ROUTINE SCREENING FOR STI (SEXUALLY TRANSMITTED INFECTION): ICD-10-CM

## 2022-10-05 DIAGNOSIS — N30.00 ACUTE CYSTITIS WITHOUT HEMATURIA: Primary | ICD-10-CM

## 2022-10-05 DIAGNOSIS — R30.0 DYSURIA: ICD-10-CM

## 2022-10-05 LAB
BILIRUBIN URINE: ABNORMAL
BLOOD URINE, POC: ABNORMAL
CHARACTER, URINE: CLEAR
COLOR, URINE: YELLOW
GLUCOSE URINE: NEGATIVE MG/DL
KETONES, URINE: NEGATIVE
LEUKOCYTE CLUMPS, URINE: ABNORMAL
NITRITE, URINE: POSITIVE
PH, URINE: 5.5 (ref 5–9)
PROTEIN, URINE: 100 MG/DL
SPECIFIC GRAVITY, URINE: >= 1.03 (ref 1–1.03)
UROBILINOGEN, URINE: 0.2 EU/DL (ref 0–1)

## 2022-10-05 PROCEDURE — 99214 OFFICE O/P EST MOD 30 MIN: CPT | Performed by: STUDENT IN AN ORGANIZED HEALTH CARE EDUCATION/TRAINING PROGRAM

## 2022-10-05 PROCEDURE — 81003 URINALYSIS AUTO W/O SCOPE: CPT | Performed by: STUDENT IN AN ORGANIZED HEALTH CARE EDUCATION/TRAINING PROGRAM

## 2022-10-05 RX ORDER — SULFAMETHOXAZOLE AND TRIMETHOPRIM 800; 160 MG/1; MG/1
1 TABLET ORAL 2 TIMES DAILY
Qty: 6 TABLET | Refills: 0 | Status: SHIPPED | OUTPATIENT
Start: 2022-10-05 | End: 2022-10-08

## 2022-10-05 ASSESSMENT — ENCOUNTER SYMPTOMS
CONSTIPATION: 0
BACK PAIN: 0
VOMITING: 0
WHEEZING: 0
SHORTNESS OF BREATH: 0
NAUSEA: 0
DIARRHEA: 0
COUGH: 0
ABDOMINAL PAIN: 0

## 2022-10-05 NOTE — PROGRESS NOTES
S: 25 y.o. female with   Chief Complaint   Patient presents with    Urinary Tract Infection     Pt presents c/o a UTI with sxs of dysuria that started Monday. 26 yo female here for recurrent urinary/vaginal symptoms    Reviewed hx and ROS in detail with resident prior to exam.  See notes for additional details. BP Readings from Last 3 Encounters:   10/05/22 98/66   09/28/22 110/60   08/03/22 112/72     Wt Readings from Last 3 Encounters:   10/05/22 109 lb (49.4 kg) (13 %, Z= -1.11)*   09/28/22 109 lb (49.4 kg) (13 %, Z= -1.11)*   05/24/22 107 lb 6.4 oz (48.7 kg) (11 %, Z= -1.20)*     * Growth percentiles are based on CDC (Girls, 2-20 Years) data. O: VS:  height is 5' 5\" (1.651 m) and weight is 109 lb (49.4 kg). Her temperature is 97.7 °F (36.5 °C). Her blood pressure is 98/66 and her pulse is 71. Her respiration is 10 and oxygen saturation is 98%. AAO/NAD, appropriate affect for mood       Diagnosis Orders   1. Dysuria  POCT Urinalysis No Micro (Auto)    Culture, Urine      2. Routine screening for STI (sexually transmitted infection)  Vaginal Pathogens DNA Panel    C. Trachomatis / N. Gonorrhoeae, DNA Probe    Vaginal Pathogens DNA Panel      3. Acute cystitis without hematuria  sulfamethoxazole-trimethoprim (BACTRIM DS;SEPTRA DS) 800-160 MG per tablet          Plan    Acute cystitis- tx bactrim DS, fluids, Azo    Vaginitis- recurrent with intercourse- recc full cultures, done during todays visit, treat appropriately pending results. Health Maintenance Due   Topic Date Due    COVID-19 Vaccine (1) Never done    Varicella vaccine (2 of 2 - 2-dose childhood series) 10/28/2006    Flu vaccine (1) Never done         Attending Physician Statement  I have discussed the case, including pertinent history and exam findings with the resident. I also have seen the patient and performed key portions of the examination. I agree with the documented assessment and plan as documented by the resident.   SHAWNA modifier added to this encounter      Alin Hernandez MD 10/5/2022 5:24 PM

## 2022-10-06 LAB
CANDIDA SPECIES, DNA PROBE: NEGATIVE
CHLAMYDIA TRACHOMATIS BY RT-PCR: NOT DETECTED
CT/NG SOURCE: NORMAL
GARDNERELLA VAGINALIS, DNA PROBE: POSITIVE
NEISSERIA GONORRHOEAE BY RT-PCR: NOT DETECTED
SOURCE: ABNORMAL
TRICHOMONAS VAGINALIS DNA: NEGATIVE

## 2022-10-07 LAB
ORGANISM: ABNORMAL
URINE CULTURE, ROUTINE: ABNORMAL

## 2022-10-12 ENCOUNTER — TELEPHONE (OUTPATIENT)
Dept: FAMILY MEDICINE CLINIC | Age: 22
End: 2022-10-12

## 2022-10-12 DIAGNOSIS — N76.0 BACTERIAL VAGINOSIS: Primary | ICD-10-CM

## 2022-10-12 DIAGNOSIS — B96.89 BACTERIAL VAGINOSIS: Primary | ICD-10-CM

## 2022-10-12 RX ORDER — METRONIDAZOLE 500 MG/1
500 TABLET ORAL 2 TIMES DAILY
Qty: 14 TABLET | Refills: 0 | Status: SHIPPED | OUTPATIENT
Start: 2022-10-12 | End: 2022-10-19

## 2022-10-12 NOTE — TELEPHONE ENCOUNTER
Flagyl sent to [facility] DR ERINN LAU for BV. Please advise patient. Thank you!

## 2022-10-26 ENCOUNTER — HOSPITAL ENCOUNTER (EMERGENCY)
Age: 22
Discharge: HOME | End: 2022-10-26
Payer: COMMERCIAL

## 2022-10-26 VITALS
HEART RATE: 87 BPM | OXYGEN SATURATION: 100 % | WEIGHT: 109 LBS | DIASTOLIC BLOOD PRESSURE: 57 MMHG | TEMPERATURE: 98.6 F | BODY MASS INDEX: 18.16 KG/M2 | RESPIRATION RATE: 16 BRPM | SYSTOLIC BLOOD PRESSURE: 117 MMHG | HEIGHT: 65 IN

## 2022-10-26 DIAGNOSIS — N39.0 ACUTE UTI (URINARY TRACT INFECTION): Primary | ICD-10-CM

## 2022-10-26 LAB
BILIRUBIN URINE: NEGATIVE
BLOOD, URINE: NEGATIVE
CHARACTER, URINE: CLEAR
COLOR: YELLOW
GLUCOSE URINE: NEGATIVE MG/DL
KETONES, URINE: ABNORMAL
LEUKOCYTE ESTERASE, URINE: ABNORMAL
NITRITE, URINE: NEGATIVE
PH UA: 5.5 (ref 5–9)
PREGNANCY, URINE: NEGATIVE
PROTEIN UA: 100 MG/DL
SPECIFIC GRAVITY UA: >= 1.03 (ref 1–1.03)
UROBILINOGEN, URINE: 0.2 EU/DL (ref 0.2–1)

## 2022-10-26 PROCEDURE — 87205 SMEAR GRAM STAIN: CPT

## 2022-10-26 PROCEDURE — 87070 CULTURE OTHR SPECIMN AEROBIC: CPT

## 2022-10-26 PROCEDURE — 81003 URINALYSIS AUTO W/O SCOPE: CPT

## 2022-10-26 PROCEDURE — 99213 OFFICE O/P EST LOW 20 MIN: CPT | Performed by: NURSE PRACTITIONER

## 2022-10-26 PROCEDURE — 84703 CHORIONIC GONADOTROPIN ASSAY: CPT

## 2022-10-26 PROCEDURE — 87086 URINE CULTURE/COLONY COUNT: CPT

## 2022-10-26 PROCEDURE — 99213 OFFICE O/P EST LOW 20 MIN: CPT

## 2022-10-26 RX ORDER — CIPROFLOXACIN 500 MG/1
500 TABLET, FILM COATED ORAL 2 TIMES DAILY
Qty: 14 TABLET | Refills: 0 | Status: SHIPPED | OUTPATIENT
Start: 2022-10-26 | End: 2022-11-02

## 2022-10-26 RX ORDER — PHENAZOPYRIDINE HYDROCHLORIDE 100 MG/1
200 TABLET, FILM COATED ORAL 3 TIMES DAILY PRN
Qty: 6 TABLET | Refills: 0 | Status: SHIPPED | OUTPATIENT
Start: 2022-10-26 | End: 2022-10-28

## 2022-10-26 ASSESSMENT — ENCOUNTER SYMPTOMS
VOMITING: 0
DIARRHEA: 0
NAUSEA: 0
COUGH: 0
SHORTNESS OF BREATH: 0
ABDOMINAL PAIN: 0

## 2022-10-26 NOTE — ED PROVIDER NOTES
[address] Encounter      [address] [facility]       Chief Complaint   Patient presents with    Dysuria      Had uti and bv  the 1st ofthis month and the same sx  today. Nurses Notes reviewed and I agree except as noted in the HPI. HISTORY OF PRESENT ILLNESS   Susan Carlson is a 23 y.o. female who presents to the urgent care for evaluation. She states that she has discomfort when she urinates. She states  she had a UTI earlier this month and was treated with Bactrim. She was also tested for STDs at that time, and she did not have one, but she did have bacterial vaginosis. She was started on Flagyl for 7 days on 10/12/2022. No current OB/GYN. The patient/patient representative has no other acute complaints at this time. REVIEW OF SYSTEMS     Review of Systems   Constitutional:  Negative for chills, fatigue and fever. Respiratory:  Negative for cough and shortness of breath. Cardiovascular:  Negative for chest pain. Gastrointestinal:  Negative for abdominal pain, diarrhea, nausea and vomiting. Genitourinary:  Positive for dysuria (discomfort). Negative for flank pain, frequency, hematuria, urgency and vaginal discharge. Skin:  Negative for rash. Allergic/Immunologic: Negative for environmental allergies and food allergies. Neurological:  Negative for headaches. PAST MEDICAL HISTORY         Diagnosis Date    Chlamydia 05/2022       SURGICAL HISTORY     Patient  has a past surgical history that includes Tonsillectomy. CURRENT MEDICATIONS       Discharge Medication List as of 10/26/2022  7:50 PM          ALLERGIES     Patient is is allergic to codeine. FAMILY HISTORY     Patient'sfamily history is not on file. SOCIAL HISTORY     Patient  reports that she has never smoked. She has never used smokeless tobacco. She reports current alcohol use. She reports current drug use. Drug: MDMA (Ecstacy).     PHYSICAL EXAM     ED TRIAGE VITALS  BP: (!) 117/57, Temp: 98.6 °F (37 °C), Heart Rate: 87, Resp: 16, SpO2: 100 %  Physical Exam  Vitals and nursing note reviewed. Constitutional:       General: She is not in acute distress. Appearance: Normal appearance. She is well-developed and well-groomed. HENT:      Head: Normocephalic and atraumatic. Right Ear: External ear normal.      Left Ear: External ear normal.      Mouth/Throat:      Lips: Pink. Mouth: Mucous membranes are moist.   Eyes:      Conjunctiva/sclera: Conjunctivae normal.      Right eye: Right conjunctiva is not injected. Left eye: Left conjunctiva is not injected. Pupils: Pupils are equal.   Cardiovascular:      Rate and Rhythm: Normal rate. Heart sounds: Normal heart sounds. Pulmonary:      Effort: Pulmonary effort is normal. No respiratory distress. Breath sounds: Normal breath sounds. Abdominal:      General: Abdomen is flat. Bowel sounds are normal.      Palpations: Abdomen is soft. Tenderness: There is no abdominal tenderness. There is no right CVA tenderness or left CVA tenderness. Musculoskeletal:      Cervical back: Normal range of motion. Skin:     General: Skin is warm and dry. Findings: No rash. Neurological:      Mental Status: She is alert and oriented to person, place, and time. Psychiatric:         Mood and Affect: Mood normal.         Speech: Speech normal.         Behavior: Behavior normal.       DIAGNOSTIC RESULTS   Labs:       IMAGING:  No orders to display     URGENT CARE COURSE:     Vitals:    10/26/22 1933   BP: (!) 117/57   Pulse: 87   Resp: 16   Temp: 98.6 °F (37 °C)   SpO2: 100%   Weight: 109 lb (49.4 kg)   Height: 5' 5\" (1.651 m)       Medications - No data to display  PROCEDURES:  FINALIMPRESSION      1.  Acute UTI (urinary tract infection)        DISPOSITION/PLAN   DISPOSITION Decision To Discharge 10/26/2022 07:48:44 PM        ED Course as of 10/29/22 0804   Wed Oct 26, 2022   1948 Leukocyte Esterase, Urine(!): Trace [HA]   1948 Protein, UA(!): 100 [HA]   1948 Ketones, Urine(!): Trace [HA]   1948 Culture, Genital [HA]   1949 Pregnancy, Urine: NEGATIVE [HA]      ED Course User Index  [LANDA] MARISOL Buitrago CNP       Problem List Items Addressed This Visit    None  Visit Diagnoses       Acute UTI (urinary tract infection)    -  Primary    Relevant Medications    ciprofloxacin (CIPRO) 500 MG tablet            Physical assessment findings, diagnostic testing(s) if applicable, and vital signs reviewed with patient/patient representative. Differential diagnosis(s) discussed with patient/patient representative. Prescription medications and/or over-the-counter medications for symptom management discussed. Patient is to follow-up with family care provider in 2-3 days if no improvement. If symptoms should worsen or change, go to the ED. Patient/patient representative is aware of care plan, questions answered, verbalizes understanding and is in agreement. Printed instructions attached to after visit summary. If COVID-19 positive or COVID-19 by PCR is pending at time of discharge patient is to quarantine/[facility]. PATIENT REFERRED TO:  Mae Johnson MD  [address] [address]    Schedule an appointment as soon as possible for a visit in 3 days  For further evaluation. , If symptoms change/worsen, go to the [address] [facility] MD Diana  [address] [facility]  [phone]    Schedule an appointment as soon as possible for a visit   For further evaluation. , If symptoms change/worsen, go to the [facility] APRN - CNP    Please note that some or all of this chart was generated using [employer] voice recognition software. Although every effort was made to ensure the accuracy of this automated transcription, some errors in transcription may have occurred.         MARISOL Buitrago CNP  10/29/22 7973

## 2022-10-28 LAB
ORGANISM: ABNORMAL
URINE CULTURE, ROUTINE: ABNORMAL

## 2023-01-31 NOTE — PROGRESS NOTES
Anne Marie Parisi is a 25 y.o. female who presents today for:  Chief Complaint   Patient presents with    Urinary Tract Infection     Pt presents c/o a UTI with sxs of dysuria that started Monday. HPI:   Anne Marie Parisi is 25 y.o. who presents today for concern of burning with urination. Patient has a history of recurrent UTIs in the past, which always occur after intercourse. She reports burning with urination, and urinary frequency that started 2 days ago. She did recently have intercourse with a new partner prior to symptom onset. She denies fever, chills, abdominal pain, nausea, vomiting. She does also have a history of recurrent yeast infections, but denies any vaginal itching, discharge, or abnormal odor at this time. She is not aware of any exposure to STIs, but would like to be tested for this. Patient is concerned about how often these symptoms recur, and is interested in further evaluation and treatment at this time. Objective:     Vitals:    10/05/22 1656   BP: 98/66   Pulse: 71   Resp: 10   Temp: 97.7 °F (36.5 °C)   SpO2: 98%   Weight: 109 lb (49.4 kg)   Height: 5' 5\" (1.651 m)       Wt Readings from Last 3 Encounters:   10/05/22 109 lb (49.4 kg) (13 %, Z= -1.11)*   09/28/22 109 lb (49.4 kg) (13 %, Z= -1.11)*   05/24/22 107 lb 6.4 oz (48.7 kg) (11 %, Z= -1.20)*     * Growth percentiles are based on CDC (Girls, 2-20 Years) data.        BP Readings from Last 3 Encounters:   10/05/22 98/66   09/28/22 110/60   08/03/22 112/72       No results found for: WBC, HGB, HCT, MCV, PLT  No results found for: NA, K, CL, CO2, BUN, CREATININE, GLUCOSE, CALCIUM, PROT, LABALBU, BILITOT, ALKPHOS, AST, ALT, LABGLOM, GFRAA, AGRATIO, GLOB  No results found for: TSH, B3KLGFP, Q2ILEMA, THYROIDAB, FT3, T4FREE  No results found for: LABA1C  No results found for: EAG  No results found for: CHOL  No results found for: TRIG  No results found for: HDL  No results found for: LDLCHOLESTEROL, LDLCALC    No results found for: Rajni Lewis    Review of Systems   Constitutional:  Negative for activity change, chills, fatigue and fever. Respiratory:  Negative for cough, shortness of breath and wheezing. Cardiovascular:  Negative for chest pain and palpitations. Gastrointestinal:  Negative for abdominal pain, constipation, diarrhea, nausea and vomiting. Genitourinary:  Positive for dysuria. Negative for flank pain, frequency, hematuria, pelvic pain and vaginal discharge. Musculoskeletal:  Negative for back pain. Neurological:  Negative for dizziness, syncope, light-headedness and headaches. Psychiatric/Behavioral:  Negative for dysphoric mood. The patient is not nervous/anxious. Physical Exam  Vitals and nursing note reviewed. Exam conducted with a chaperone present. Constitutional:       Appearance: Normal appearance. She is normal weight. HENT:      Head: Normocephalic and atraumatic. Nose: Nose normal.      Mouth/Throat:      Mouth: Mucous membranes are moist.   Eyes:      Extraocular Movements: Extraocular movements intact. Conjunctiva/sclera: Conjunctivae normal.      Pupils: Pupils are equal, round, and reactive to light. Cardiovascular:      Rate and Rhythm: Normal rate and regular rhythm. Pulses: Normal pulses. Heart sounds: No murmur heard. Pulmonary:      Effort: Pulmonary effort is normal. No respiratory distress. Breath sounds: Normal breath sounds. No wheezing. Abdominal:      General: Abdomen is flat. Bowel sounds are normal. There is no distension. Palpations: Abdomen is soft. There is no mass. Tenderness: There is no abdominal tenderness. There is no right CVA tenderness or left CVA tenderness. Genitourinary:     Exam position: Lithotomy position. Labia:         Right: No tenderness or lesion. Left: No tenderness or lesion. Vagina: No vaginal discharge, erythema or tenderness.       Cervix: No cervical motion tenderness, discharge, friability or erythema. Uterus: Not tender. Adnexa:         Right: No tenderness. Left: No tenderness. Comments: Chaperone present for exam, speculum exam reveals nulliparous cervix with menstrual blood present in vaginal vault. No abnormal discharge or odor noted on exam.  No cervical motion tenderness. No adnexal tenderness. Musculoskeletal:      Cervical back: Neck supple. Skin:     General: Skin is warm and dry. Neurological:      General: No focal deficit present. Mental Status: She is alert and oriented to person, place, and time. Psychiatric:         Mood and Affect: Mood normal.         Behavior: Behavior normal.       Immunization History   Administered Date(s) Administered    DTaP (Infanrix) 2002, 02/20/2003, 04/28/2003, 01/20/2004, 03/07/2008    HPV 9-valent John Milo) 07/25/2017    HPV Quadrivalent (Gardasil) 04/22/2015    Hepatitis A Ped/Adol (Havrix, Vaqta) 04/22/2015, 07/25/2017    Hepatitis B 2002, 2002, 04/28/2003    Hib (HbOC) 01/14/2003, 03/06/2003, 04/28/2003, 01/20/2004    MMR 11/06/2003, 03/07/2008    Meningococcal MCV4O (Menveo) 04/22/2015    Pneumococcal Conjugate 13-valent (Jose Carlos Haver) 02/20/2003, 11/06/2003, 01/20/2004, 11/11/2004    Polio IPV (IPOL) 01/14/2003, 03/06/2003, 04/28/2003, 03/17/2008    Tdap (Boostrix, Adacel) 04/22/2015    Varicella (Varivax) 11/06/2003       Health Maintenance Due   Topic Date Due    COVID-19 Vaccine (1) Never done    Varicella vaccine (2 of 2 - 2-dose childhood series) 10/28/2006    Flu vaccine (1) Never done       Food Insecurity: No Food Insecurity    Worried About [employer] in the Last Year: Never true    Ran Out of Food in the Last Year: Never true       Assessment / Plan:   1. Acute cystitis without hematuria  2. Dysuria  UA in office shows nitrites and leukocyte clumps present, consistent with UTI.   -Treat with Bactrim x3 days  -Urine culture sent for sensitivities, follow-up based on results  -Due to recurrent UTIs, will send out lab to evaluate for mycoplasma genitalium  -Consider possible prophylactic antibiotic therapy in the future due to recurrent UTIs  - sulfamethoxazole-trimethoprim (BACTRIM DS;SEPTRA DS) 800-160 MG per tablet; Take 1 tablet by mouth 2 times daily for 3 days  Dispense: 6 tablet; Refill: 0  - Miscellaneous Sendout; Future  - POCT Urinalysis No Micro (Auto)  - Culture, Urine  - Miscellaneous Sendout; Future    3. Routine screening for STI (sexually transmitted infection)  - Vaginal Pathogens DNA Panel; Future  - C. Trachomatis / N. Gonorrhoeae, DNA Probe  - Vaginal Pathogens DNA Panel           Return if symptoms worsen or fail to improve. Medications Prescribed:  Orders Placed This Encounter   Medications    sulfamethoxazole-trimethoprim (BACTRIM DS;SEPTRA DS) 800-160 MG per tablet     Sig: Take 1 tablet by mouth 2 times daily for 3 days     Dispense:  6 tablet     Refill:  0         No future appointments. Patient given educational materials - see patient instructions. Discussed use, benefit, and sideeffects of prescribed medications. All patient questions answered. Pt voiced understanding. Reviewed health maintenance. Instructed to continue current medications, diet and exercise. Patient agreed with treatment plan. Follow up as directed.      Electronically signed by Ciro Beavers MD on 10/5/2022 at 10:08 PM Patient with one or more new problems requiring additional work-up/treatment.

## 2023-03-31 ENCOUNTER — NURSE ONLY (OUTPATIENT)
Dept: LAB | Age: 23
End: 2023-03-31

## 2023-03-31 ENCOUNTER — OFFICE VISIT (OUTPATIENT)
Dept: FAMILY MEDICINE CLINIC | Age: 23
End: 2023-03-31

## 2023-03-31 VITALS
HEART RATE: 80 BPM | BODY MASS INDEX: 18.49 KG/M2 | WEIGHT: 111 LBS | SYSTOLIC BLOOD PRESSURE: 110 MMHG | RESPIRATION RATE: 14 BRPM | OXYGEN SATURATION: 99 % | DIASTOLIC BLOOD PRESSURE: 64 MMHG | TEMPERATURE: 97.6 F | HEIGHT: 65 IN

## 2023-03-31 DIAGNOSIS — Z00.00 ENCOUNTER FOR WELL ADULT EXAM WITHOUT ABNORMAL FINDINGS: Primary | ICD-10-CM

## 2023-03-31 DIAGNOSIS — R10.13 EPIGASTRIC PAIN: ICD-10-CM

## 2023-03-31 DIAGNOSIS — F41.9 ANXIETY: ICD-10-CM

## 2023-03-31 DIAGNOSIS — L70.0 ACNE VULGARIS: ICD-10-CM

## 2023-03-31 LAB
ALBUMIN SERPL BCG-MCNC: 5.1 G/DL (ref 3.5–5.1)
ALP SERPL-CCNC: 57 U/L (ref 38–126)
ALT SERPL W/O P-5'-P-CCNC: 12 U/L (ref 11–66)
ANION GAP SERPL CALC-SCNC: 11 MEQ/L (ref 8–16)
AST SERPL-CCNC: 17 U/L (ref 5–40)
BASOPHILS ABSOLUTE: 0.1 THOU/MM3 (ref 0–0.1)
BASOPHILS NFR BLD AUTO: 0.7 %
BILIRUB SERPL-MCNC: 0.4 MG/DL (ref 0.3–1.2)
BUN SERPL-MCNC: 12 MG/DL (ref 7–22)
CALCIUM SERPL-MCNC: 10.2 MG/DL (ref 8.5–10.5)
CHLORIDE SERPL-SCNC: 103 MEQ/L (ref 98–111)
CO2 SERPL-SCNC: 25 MEQ/L (ref 23–33)
CREAT SERPL-MCNC: 0.6 MG/DL (ref 0.4–1.2)
DEPRECATED RDW RBC AUTO: 40.8 FL (ref 35–45)
EOSINOPHIL NFR BLD AUTO: 1.6 %
EOSINOPHILS ABSOLUTE: 0.1 THOU/MM3 (ref 0–0.4)
ERYTHROCYTE [DISTWIDTH] IN BLOOD BY AUTOMATED COUNT: 12.1 % (ref 11.5–14.5)
GFR SERPL CREATININE-BSD FRML MDRD: > 60 ML/MIN/1.73M2
GLUCOSE SERPL-MCNC: 102 MG/DL (ref 70–108)
HCT VFR BLD AUTO: 43.8 % (ref 37–47)
HGB BLD-MCNC: 14.2 GM/DL (ref 12–16)
IMM GRANULOCYTES # BLD AUTO: 0.02 THOU/MM3 (ref 0–0.07)
IMM GRANULOCYTES NFR BLD AUTO: 0.3 %
LIPASE SERPL-CCNC: 38.5 U/L (ref 5.6–51.3)
LYMPHOCYTES ABSOLUTE: 2.2 THOU/MM3 (ref 1–4.8)
LYMPHOCYTES NFR BLD AUTO: 30.5 %
MCH RBC QN AUTO: 29.6 PG (ref 26–33)
MCHC RBC AUTO-ENTMCNC: 32.4 GM/DL (ref 32.2–35.5)
MCV RBC AUTO: 91.4 FL (ref 81–99)
MONOCYTES ABSOLUTE: 0.5 THOU/MM3 (ref 0.4–1.3)
MONOCYTES NFR BLD AUTO: 7.3 %
NEUTROPHILS NFR BLD AUTO: 59.6 %
NRBC BLD AUTO-RTO: 0 /100 WBC
PLATELET # BLD AUTO: 376 THOU/MM3 (ref 130–400)
PMV BLD AUTO: 10.5 FL (ref 9.4–12.4)
POTASSIUM SERPL-SCNC: 3.7 MEQ/L (ref 3.5–5.2)
PROT SERPL-MCNC: 8.5 G/DL (ref 6.1–8)
RBC # BLD AUTO: 4.79 MILL/MM3 (ref 4.2–5.4)
SEGMENTED NEUTROPHILS ABSOLUTE COUNT: 4.4 THOU/MM3 (ref 1.8–7.7)
SODIUM SERPL-SCNC: 139 MEQ/L (ref 135–145)
T4 FREE SERPL-MCNC: 1.12 NG/DL (ref 0.93–1.76)
TSH SERPL DL<=0.005 MIU/L-ACNC: 2.15 UIU/ML (ref 0.4–4.2)
WBC # BLD AUTO: 7.3 THOU/MM3 (ref 4.8–10.8)

## 2023-03-31 RX ORDER — FAMOTIDINE 20 MG/1
20 TABLET, FILM COATED ORAL 2 TIMES DAILY
Qty: 60 TABLET | Refills: 3 | Status: SHIPPED | OUTPATIENT
Start: 2023-03-31

## 2023-03-31 SDOH — ECONOMIC STABILITY: FOOD INSECURITY: WITHIN THE PAST 12 MONTHS, YOU WORRIED THAT YOUR FOOD WOULD RUN OUT BEFORE YOU GOT MONEY TO BUY MORE.: NEVER TRUE

## 2023-03-31 SDOH — ECONOMIC STABILITY: HOUSING INSECURITY
IN THE LAST 12 MONTHS, WAS THERE A TIME WHEN YOU DID NOT HAVE A STEADY PLACE TO SLEEP OR SLEPT IN A SHELTER (INCLUDING NOW)?: NO

## 2023-03-31 SDOH — ECONOMIC STABILITY: FOOD INSECURITY: WITHIN THE PAST 12 MONTHS, THE FOOD YOU BOUGHT JUST DIDN'T LAST AND YOU DIDN'T HAVE MONEY TO GET MORE.: NEVER TRUE

## 2023-03-31 SDOH — ECONOMIC STABILITY: INCOME INSECURITY: HOW HARD IS IT FOR YOU TO PAY FOR THE VERY BASICS LIKE FOOD, HOUSING, MEDICAL CARE, AND HEATING?: NOT HARD AT ALL

## 2023-03-31 ASSESSMENT — ENCOUNTER SYMPTOMS
VOMITING: 0
DIARRHEA: 0
NAUSEA: 0
CONSTIPATION: 0
SHORTNESS OF BREATH: 0
COUGH: 0

## 2023-03-31 ASSESSMENT — PATIENT HEALTH QUESTIONNAIRE - PHQ9
SUM OF ALL RESPONSES TO PHQ QUESTIONS 1-9: 0
2. FEELING DOWN, DEPRESSED OR HOPELESS: 0
1. LITTLE INTEREST OR PLEASURE IN DOING THINGS: 0
SUM OF ALL RESPONSES TO PHQ QUESTIONS 1-9: 0
SUM OF ALL RESPONSES TO PHQ9 QUESTIONS 1 & 2: 0

## 2023-03-31 NOTE — PROGRESS NOTES
S: 23 y.o. female with   Chief Complaint   Patient presents with    Annual Exam     Her apartment needs a note for her cat to live there, recommendation for therapist and dermatologist, and discuss stomach issues     HPI per Dr. Ally Hinds    BP Readings from Last 3 Encounters:   03/31/23 110/64   10/26/22 (!) 117/57   10/05/22 98/66     Wt Readings from Last 3 Encounters:   03/31/23 111 lb (50.3 kg)   10/26/22 109 lb (49.4 kg) (13 %, Z= -1.11)*   10/05/22 109 lb (49.4 kg) (13 %, Z= -1.11)*     * Growth percentiles are based on CDC (Girls, 2-20 Years) data. O: VS:  height is 5' 5\" (1.651 m) and weight is 111 lb (50.3 kg). Her temporal temperature is 97.6 °F (36.4 °C). Her blood pressure is 110/64 and her pulse is 80. Her respiration is 14 and oxygen saturation is 99%. Diagnosis Orders   1. Encounter for well adult exam without abnormal findings        2. Acne vulgaris        3. Epigastric pain        4. Anxiety            Plan  Physical completed today. No major concerns. Labs and referrals as ordered. Try some pepcid for epigastric pain. Follow up as scheduled. Health Maintenance Due   Topic Date Due    COVID-19 Vaccine (1) Never done    Varicella vaccine (2 of 2 - 2-dose childhood series) 10/28/2006    Flu vaccine (1) Never done    Depression Screen  01/14/2023         Attending Physician Statement  I have discussed the case, including pertinent history and exam findings with the resident. I agree with the documented assessment and plan as documented by the resident.   GE modifier added to this encounter      Chris Tabor DO 3/31/2023 11:23 AM

## 2023-03-31 NOTE — PROGRESS NOTES
[address] [address]. [address]  [facility] [address]  Dept: [phone]  Loc: [phone]     Khris Jensen is a 23 y.o. female who presents today for:  Chief Complaint   Patient presents with    Annual Exam     Her apartment needs a note for her cat to live there, recommendation for therapist and dermatologist, and discuss stomach issues        Goals    None         HPI:     HPI  27-year-old female with history of acne and onychomycosis with history of recurrent UTIs after intercourse here for physical exam.     Patient states she needs her physical for a yearly check. Overall says her health has been well and she has not gotten sick. Patient would like some referrals to dermatology and a counselor. She would also like assistance with a emotional support letter for her apartment building. Acne: dermatology - spent the last year trying to figure things out and wants to see a specialist. Has tried different moisturizers, serums, face washes. Did use Clindagel in past, which made it worse. Patient requesting referral.     Anxiety: Patient reports that she tends to overthink things a lot. Oftentimes can be on edge. Has not had a healthy relationship before and has some childhood stuff she would like to seek counseling for. Requests referral for counseling    Apartment: Patient states she has 2 cats, the apartment only allows her to have 1 for free. Patient states she would like to use the other as an emotional support get. Patient does have anxiety and her cats do help calm her down. Abdominal issues: Patient states she has intermittent epigastric pain that sometimes radiates to the back. Also reports intermittent chest palpitations. Sometimes she will have the pain after eating. Does not have a distinct pattern when the pain occurs. Reports that the pain sometimes feels like a sharp pain that is worse with movement.

## 2023-03-31 NOTE — PROGRESS NOTES
[address] [facility] [address] [facility]  Dept: [phone]  Loc: [phone]     Ruel Isbell is a 23 y.o. female who presents today for:  No chief complaint on file. Goals    None         HPI:     HPI  27-year-old female with no significant PMH here for physicial exam  Has history of acne and onychomycosis    No current outpatient medications on file. No current facility-administered medications for this visit. Food Insecurity: Not on file       Health Maintenance   Topic Date Due    COVID-19 Vaccine (1) Never done    Varicella vaccine (2 of 2 - 2-dose childhood series) 10/28/2006    Flu vaccine (1) Never done    Depression Screen  01/14/2023    Chlamydia/GC screen  10/05/2023    DTaP/Tdap/Td vaccine (7 - Td or Tdap) 04/22/2025    Hepatitis A vaccine  Completed    Hib vaccine  Completed    HPV vaccine  Completed    Pneumococcal 0-64 years Vaccine  Completed    Hepatitis C screen  Completed    HIV screen  Completed    Meningococcal (ACWY) vaccine  Aged Out       ROS:      Review of Systems      Objective: There were no vitals filed for this visit. There is no height or weight on file to calculate BMI. Wt Readings from Last 3 Encounters:   10/26/22 109 lb (49.4 kg) (13 %, Z= -1.11)*   10/05/22 109 lb (49.4 kg) (13 %, Z= -1.11)*   09/28/22 109 lb (49.4 kg) (13 %, Z= -1.11)*     * Growth percentiles are based on CDC (Girls, 2-20 Years) data. BP Readings from Last 3 Encounters:   10/26/22 (!) 117/57   10/05/22 98/66   09/28/22 110/60       Physical Exam    Assessment / Plan:     There are no diagnoses linked to this encounter. Health Maintenance Due   Topic Date Due    COVID-19 Vaccine (1) Never done    Varicella vaccine (2 of 2 - 2-dose childhood series) 10/28/2006    Flu vaccine (1) Never done    Depression Screen  01/14/2023           No follow-ups on file.       Medications Prescribed:  No

## 2023-03-31 NOTE — PROGRESS NOTES
Health Maintenance Due   Topic Date Due    COVID-19 Vaccine (1) Never done    Varicella vaccine (2 of 2 - 2-dose childhood series) 10/28/2006    Flu vaccine (1) Never done    Depression Screen  01/14/2023

## 2023-03-31 NOTE — LETTER
March 31, 2023       Bia Caballero YOB: 2000   [address] [place] [address] Date of Visit:  3/31/2023       To Whom It May Concern:      I am a licensed physician and have evaluated and assessed the history of the functional limitations imposed by an emotional / mental related illness. After careful review of mental and behavioral health condition I have concluded that Dorrie Osler has certain limitations related to anxiety. In order to help alleviate the symptoms of anxiety-related mental illness, and to enhance her ability to function independently with full use of the dwelling unit you own and/or manage, it is my recommendation that Amy should have an emotional support animal.  The presence of this animal is supportive of the emotional and mental health of Amy, as its presence will mitigate the symptoms Dorrie Osler is currently experiencing. Please allow Amy to be accompanied by the emotional support animal Aslan of cat with approx. 25 lbs in [facility] residence. If you have any questions or concerns, please don't hesitate to call.     Sincerely,        Elaine Calzada MD

## 2023-04-02 ASSESSMENT — ENCOUNTER SYMPTOMS
ABDOMINAL PAIN: 1
ABDOMINAL DISTENTION: 1
ROS SKIN COMMENTS: ACNE

## 2023-04-02 NOTE — RESULT ENCOUNTER NOTE
Thom Reyes, I reviewed your labs. Everything looked great -your electrolytes, kidney and liver function were all normal.  Thyroid function was normal.  Lipase was normal -no sign of pancreatitis. CBC was all normal - which means no signs of infection or anemia. Let me know if you have questions or concerns.

## 2023-05-16 ENCOUNTER — HOSPITAL ENCOUNTER (EMERGENCY)
Age: 23
Discharge: HOME | End: 2023-05-16
Payer: COMMERCIAL

## 2023-05-16 VITALS
BODY MASS INDEX: 18.33 KG/M2 | WEIGHT: 110 LBS | RESPIRATION RATE: 16 BRPM | SYSTOLIC BLOOD PRESSURE: 128 MMHG | OXYGEN SATURATION: 100 % | HEART RATE: 76 BPM | TEMPERATURE: 97.8 F | DIASTOLIC BLOOD PRESSURE: 75 MMHG | HEIGHT: 65 IN

## 2023-05-16 DIAGNOSIS — N30.00 ACUTE CYSTITIS WITHOUT HEMATURIA: Primary | ICD-10-CM

## 2023-05-16 LAB
BILIRUB UR STRIP.AUTO-MCNC: NEGATIVE MG/DL
CHARACTER UR: CLEAR
COLOR: YELLOW
GLUCOSE UR QL STRIP.AUTO: NEGATIVE MG/DL
HCG UR QL: NEGATIVE
KETONES UR QL STRIP.AUTO: ABNORMAL
NITRITE UR QL STRIP.AUTO: NEGATIVE
PH UR STRIP.AUTO: 7 [PH] (ref 5–9)
PROT UR STRIP.AUTO-MCNC: 30 MG/DL
RBC #/AREA URNS HPF: NEGATIVE /[HPF]
SP GR UR STRIP.AUTO: 1.02 (ref 1–1.03)
T VAGINALIS AG GENITAL QL IA: NEGATIVE
UROBILINOGEN, URINE: 0.2 EU/DL (ref 0.2–1)
WBC #/AREA URNS HPF: ABNORMAL /[HPF]

## 2023-05-16 PROCEDURE — 87205 SMEAR GRAM STAIN: CPT

## 2023-05-16 PROCEDURE — 99214 OFFICE O/P EST MOD 30 MIN: CPT | Performed by: NURSE PRACTITIONER

## 2023-05-16 PROCEDURE — 87491 CHLMYD TRACH DNA AMP PROBE: CPT

## 2023-05-16 PROCEDURE — 81003 URINALYSIS AUTO W/O SCOPE: CPT

## 2023-05-16 PROCEDURE — 87070 CULTURE OTHR SPECIMN AEROBIC: CPT

## 2023-05-16 PROCEDURE — 84703 CHORIONIC GONADOTROPIN ASSAY: CPT

## 2023-05-16 PROCEDURE — 87086 URINE CULTURE/COLONY COUNT: CPT

## 2023-05-16 PROCEDURE — 99213 OFFICE O/P EST LOW 20 MIN: CPT

## 2023-05-16 PROCEDURE — 87591 N.GONORRHOEAE DNA AMP PROB: CPT

## 2023-05-16 PROCEDURE — 87808 TRICHOMONAS ASSAY W/OPTIC: CPT

## 2023-05-16 RX ORDER — NITROFURANTOIN 25; 75 MG/1; MG/1
100 CAPSULE ORAL 2 TIMES DAILY
Qty: 14 CAPSULE | Refills: 0 | Status: SHIPPED | OUTPATIENT
Start: 2023-05-16 | End: 2023-05-23

## 2023-05-16 ASSESSMENT — ENCOUNTER SYMPTOMS
NAUSEA: 0
SHORTNESS OF BREATH: 0
CONSTIPATION: 0
WHEEZING: 0
BLOOD IN STOOL: 0
COUGH: 0
VOMITING: 0
EYE PAIN: 0
RHINORRHEA: 0
DIARRHEA: 0
ABDOMINAL PAIN: 0

## 2023-05-16 ASSESSMENT — PAIN DESCRIPTION - FREQUENCY: FREQUENCY: INTERMITTENT

## 2023-05-16 ASSESSMENT — PAIN DESCRIPTION - LOCATION: LOCATION: VAGINA

## 2023-05-16 ASSESSMENT — PAIN SCALES - GENERAL: PAINLEVEL_OUTOF10: 2

## 2023-05-16 ASSESSMENT — PAIN - FUNCTIONAL ASSESSMENT
PAIN_FUNCTIONAL_ASSESSMENT: ACTIVITIES ARE NOT PREVENTED
PAIN_FUNCTIONAL_ASSESSMENT: 0-10

## 2023-05-16 ASSESSMENT — PAIN DESCRIPTION - PAIN TYPE: TYPE: ACUTE PAIN

## 2023-05-16 ASSESSMENT — PAIN DESCRIPTION - ONSET: ONSET: ON-GOING

## 2023-05-16 ASSESSMENT — PAIN DESCRIPTION - DESCRIPTORS: DESCRIPTORS: DISCOMFORT

## 2023-05-16 NOTE — ED NOTES
Discharge instructions and prescriptions reviewed with pt. Pt verbalized understanding. Pt ambulated out in stable condition. Assessment unchanged upon discharge.      Alexa Brewster RN  05/16/23 [id number]

## 2023-05-16 NOTE — ED PROVIDER NOTES
[id number]       Chief Complaint   Patient presents with    Urinary Tract Infection    Vaginal Discharge        Nurses Notes reviewed and I agree except as noted in the HPI. HISTORY OF PRESENT ILLNESS   Marysol Minor is a 23 y.o. female who presents to urgent care with complaint of occasional dysuria and green vaginal discharge started a couple days ago. Patient states she has had UTIs in the past and states that it kind of feels like that, but is not consistently painful. She also states she has had green vaginal discharge without vaginal pain or vaginal itching. Patient denies other symptoms including chest pain, shortness of breath, CVA tenderness, nausea, vomiting, diarrhea, pelvic pain or known fever. REVIEW OF SYSTEMS     Review of Systems   Constitutional:  Negative for appetite change, chills, fatigue, fever and unexpected weight change. HENT:  Negative for ear pain and rhinorrhea. Eyes:  Negative for pain and visual disturbance. Respiratory:  Negative for cough, shortness of breath and wheezing. Cardiovascular:  Negative for chest pain, palpitations and leg swelling. Gastrointestinal:  Negative for abdominal pain, blood in stool, constipation, diarrhea, nausea and vomiting. Genitourinary:  Positive for dysuria and frequency. Negative for hematuria. Musculoskeletal:  Negative for arthralgias, joint swelling and neck stiffness. Skin:  Negative for rash. Neurological:  Negative for dizziness, syncope, weakness, light-headedness and headaches. Hematological:  Does not bruise/bleed easily. PAST MEDICAL HISTORY         Diagnosis Date    Chlamydia 05/2022       SURGICAL HISTORY     Patient  has a past surgical history that includes Tonsillectomy.     CURRENT MEDICATIONS       Previous Medications    FAMOTIDINE (PEPCID) 20 MG TABLET    Take 1 tablet by mouth 2 times daily       ALLERGIES     Patient is is allergic to

## 2023-05-18 ENCOUNTER — OFFICE VISIT (OUTPATIENT)
Dept: FAMILY MEDICINE CLINIC | Age: 23
End: 2023-05-18
Payer: COMMERCIAL

## 2023-05-18 VITALS
WEIGHT: 107.2 LBS | HEIGHT: 65 IN | BODY MASS INDEX: 17.86 KG/M2 | HEART RATE: 82 BPM | DIASTOLIC BLOOD PRESSURE: 78 MMHG | RESPIRATION RATE: 12 BRPM | TEMPERATURE: 97 F | SYSTOLIC BLOOD PRESSURE: 126 MMHG | OXYGEN SATURATION: 100 %

## 2023-05-18 DIAGNOSIS — R30.0 DYSURIA: ICD-10-CM

## 2023-05-18 DIAGNOSIS — B35.1 ONYCHOMYCOSIS OF GREAT TOE: Primary | ICD-10-CM

## 2023-05-18 DIAGNOSIS — R10.13 EPIGASTRIC PAIN: ICD-10-CM

## 2023-05-18 LAB
BACTERIA UR CULT: ABNORMAL
ORGANISM: ABNORMAL

## 2023-05-18 PROCEDURE — 1036F TOBACCO NON-USER: CPT | Performed by: STUDENT IN AN ORGANIZED HEALTH CARE EDUCATION/TRAINING PROGRAM

## 2023-05-18 PROCEDURE — G8419 CALC BMI OUT NRM PARAM NOF/U: HCPCS | Performed by: STUDENT IN AN ORGANIZED HEALTH CARE EDUCATION/TRAINING PROGRAM

## 2023-05-18 PROCEDURE — 99214 OFFICE O/P EST MOD 30 MIN: CPT | Performed by: STUDENT IN AN ORGANIZED HEALTH CARE EDUCATION/TRAINING PROGRAM

## 2023-05-18 PROCEDURE — G8427 DOCREV CUR MEDS BY ELIG CLIN: HCPCS | Performed by: STUDENT IN AN ORGANIZED HEALTH CARE EDUCATION/TRAINING PROGRAM

## 2023-05-18 RX ORDER — TERBINAFINE HYDROCHLORIDE 250 MG/1
250 TABLET ORAL DAILY
Qty: 84 TABLET | Refills: 0 | Status: SHIPPED | OUTPATIENT
Start: 2023-05-18 | End: 2023-08-10

## 2023-05-18 ASSESSMENT — ENCOUNTER SYMPTOMS
ABDOMINAL PAIN: 0
NAUSEA: 0
WHEEZING: 0
BACK PAIN: 0
VOMITING: 0
COUGH: 0
SHORTNESS OF BREATH: 0
CONSTIPATION: 0
DIARRHEA: 0

## 2023-05-18 NOTE — PATIENT INSTRUCTIONS
Thank you   Thank you for trusting us with your healthcare needs. You may receive a survey regarding today's visit. It would help us out if you would take a few moments to provide your feedback. We value your input. Please bring in ALL medications BOTTLES, including inhalers, herbal supplements, over the counter, prescribed & non-prescribed medicine. The office would like actual medication bottles and a list.   Please note our OFFICE POLICIES:   Prior to getting your labs drawn, please check with your insurance company for benefits and eligibility of lab services. Often, insurance companies cover certain tests for preventative visits only. It is patient's responsibility to see what is covered. We are unable to change a diagnosis after the test has been performed. Lab orders will not be re-printed. Please hold onto your original lab orders and take them to your lab to be completed. If you no show your scheduled appointment three times, you will be dismissed from this practice. Reschedules must be completed 24 hours prior to your schedule appointment. If the list below has been completed, PLEASE FAX RECORDS TO OUR OFFICE @ [phone].  Once the records have been received we will update your records at our office:  Health Maintenance Due   Topic Date Due    COVID-19 Vaccine (1) Never done    Varicella vaccine (2 of 2 - 2-dose childhood series) 10/28/2006

## 2023-05-18 NOTE — PROGRESS NOTES
Health Maintenance Due   Topic Date Due    COVID-19 Vaccine (1) Never done    Varicella vaccine (2 of 2 - 2-dose childhood series) 10/28/2006

## 2023-05-18 NOTE — PROGRESS NOTES
Kristin Perry is a 23 y.o. female who presents today for:  Chief Complaint   Patient presents with    Follow-up     6 Week Follow-up          HPI:   Kristin Perry is 23 y.o. who presents today for 6 week follow-up. Patient was having epigastric abdominal pain, mainly after eating at last visit. She was started on Pepcid BID. She has not been taking this as symptoms have since resolved. Lab work completed without abnormalities    Patient also has history of recurrent UTIs. She was seen at [facility]16[facility] for dysuria and frequency. UA with small +LE, negative otherwise. GC/Chlamydia/Trich negative. She was started on Macrobid. Urine Culture shows only contaminants. Symptoms have resolved. UTI - contaminants    Patient's left big toe has abnormal nail growth. Previously received treatment for fungal infection with Lamisil PO x12 weeks. This helped improve all of her other nails, but her big toe remains thickened, yellow colored. She denies any recent injury.         Objective:     Vitals:    05/18/23 1609   BP: 126/78   Site: Left Upper Arm   Position: Sitting   Cuff Size: Medium Adult   Pulse: 82   Resp: 12   Temp: 97 °F (36.1 °C)   TempSrc: Temporal   SpO2: 100%   Weight: 107 lb 3.2 oz (48.6 kg)   Height: 5' 5\" (1.651 m)       Wt Readings from Last 3 Encounters:   05/18/23 107 lb 3.2 oz (48.6 kg)   05/16/23 110 lb (49.9 kg)   03/31/23 111 lb (50.3 kg)       BP Readings from Last 3 Encounters:   05/18/23 126/78   05/16/23 128/75   03/31/23 110/64       Lab Results   Component Value Date    WBC 7.3 03/31/2023    HGB 14.2 03/31/2023    HCT 43.8 03/31/2023    MCV 91.4 03/31/2023     03/31/2023     Lab Results   Component Value Date     03/31/2023    K 3.7 03/31/2023     03/31/2023    CO2 25 03/31/2023    BUN 12 03/31/2023    CREATININE 0.6 03/31/2023    GLUCOSE 102 03/31/2023    CALCIUM 10.2 03/31/2023    PROT 8.5 (H) 03/31/2023    LABALBU 5.1 03/31/2023    BILITOT 0.4 03/31/2023    ALKPHOS 57

## 2023-05-18 NOTE — PROGRESS NOTES
S: 23 y.o. female with   Chief Complaint   Patient presents with    Follow-up     6 Week Follow-up        HPI: please see resident note for HPI and ROS. BP Readings from Last 3 Encounters:   05/18/23 126/78   05/16/23 128/75   03/31/23 110/64     Wt Readings from Last 3 Encounters:   05/18/23 107 lb 3.2 oz (48.6 kg)   05/16/23 110 lb (49.9 kg)   03/31/23 111 lb (50.3 kg)       O: VS:  height is 5' 5\" (1.651 m) and weight is 107 lb 3.2 oz (48.6 kg). Her temporal temperature is 97 °F (36.1 °C). Her blood pressure is 126/78 and her pulse is 82. Her respiration is 12 and oxygen saturation is 100%. AAO/NAD, appropriate affect for mood       Diagnosis Orders   1. Onychomycosis of great toe  terbinafine (LAMISIL) 250 MG tablet    Comprehensive Metabolic Panel      2. Dysuria        3. Epigastric pain            Plan:  Please refer to resident note for full plan. 82-year-old female with history of recurrent UTI's, currently on Macrobid but urine culture grew only contaminants. Agree with discontinuing Macrobid. Gonorrhea and chlamydia screening negative. Also complains of toenail fungus. OK to proceed with oral Lamisil x 12 weeks. Recent CMP normal -- repeat again in 6 weeks. Follow up in 3 months for recheck or sooner if needed. Health Maintenance Due   Topic Date Due    COVID-19 Vaccine (1) Never done    Varicella vaccine (2 of 2 - 2-dose childhood series) 10/28/2006       Attending Physician Statement  I have discussed the case, including pertinent history and exam findings with the resident. I also have seen the patient and performed key portions of the examination. I agree with the documented assessment and plan as documented by the resident.         Armando Rodriguez,  5/19/2023 5:05 PM

## 2023-05-19 LAB
BACTERIA GENITAL AEROBE CULT: NORMAL
GRAM STN GENITAL: NORMAL

## 2023-06-22 DIAGNOSIS — B35.1 ONYCHOMYCOSIS OF GREAT TOE: ICD-10-CM

## 2023-06-22 RX ORDER — CLINDAMYCIN PHOSPHATE 11.9 MG/ML
SOLUTION TOPICAL
COMMUNITY
Start: 2023-06-01

## 2023-06-22 RX ORDER — MINOCYCLINE HYDROCHLORIDE 100 MG/1
100 CAPSULE ORAL DAILY
COMMUNITY
Start: 2023-06-01

## 2023-06-22 RX ORDER — TERBINAFINE HYDROCHLORIDE 250 MG/1
250 TABLET ORAL DAILY
Qty: 84 TABLET | Refills: 0 | OUTPATIENT
Start: 2023-06-22 | End: 2023-09-14

## 2023-06-22 RX ORDER — BENZOYL PEROXIDE 50 MG/ML
LIQUID TOPICAL
COMMUNITY
Start: 2023-06-01

## 2023-08-18 ENCOUNTER — OFFICE VISIT (OUTPATIENT)
Dept: FAMILY MEDICINE CLINIC | Age: 23
End: 2023-08-18

## 2023-08-18 VITALS
WEIGHT: 100.8 LBS | HEART RATE: 67 BPM | BODY MASS INDEX: 16.79 KG/M2 | HEIGHT: 65 IN | DIASTOLIC BLOOD PRESSURE: 62 MMHG | OXYGEN SATURATION: 96 % | RESPIRATION RATE: 12 BRPM | SYSTOLIC BLOOD PRESSURE: 110 MMHG

## 2023-08-18 DIAGNOSIS — B35.1 ONYCHOMYCOSIS OF GREAT TOE: Primary | ICD-10-CM

## 2023-08-18 ASSESSMENT — ENCOUNTER SYMPTOMS
SHORTNESS OF BREATH: 0
WHEEZING: 0
NAUSEA: 0
VOMITING: 0
CONSTIPATION: 0
ABDOMINAL PAIN: 0
DIARRHEA: 0
COUGH: 0

## 2023-08-18 NOTE — PROGRESS NOTES
S: 23 y.o. female with   Chief Complaint   Patient presents with    Follow-up     Pt presents for 3 mo f/u for onychomycosis of bilateral great toe's. Pt states there is no drainage or pain, and she feels better, but her toenails have no grown back. HPI per Dr. Elsi Cotto    BP Readings from Last 3 Encounters:   08/18/23 110/62   05/18/23 126/78   05/16/23 128/75     Wt Readings from Last 3 Encounters:   08/18/23 100 lb 12.8 oz (45.7 kg)   05/18/23 107 lb 3.2 oz (48.6 kg)   05/16/23 110 lb (49.9 kg)           O: VS:  height is 5' 5\" (1.651 m) and weight is 100 lb 12.8 oz (45.7 kg). Her blood pressure is 110/62 and her pulse is 67. Her respiration is 12 and oxygen saturation is 96%. Diagnosis Orders   1. Onychomycosis of great toe            Plan  Improved with lamisil. No other concerns. Follow up as needed for this issue. Health Maintenance Due   Topic Date Due    COVID-19 Vaccine (1) Never done    Varicella vaccine (2 of 2 - 2-dose childhood series) 10/28/2006    Flu vaccine (1) Never done         Attending Physician Statement  I have discussed the case, including pertinent history and exam findings with the resident. I agree with the documented assessment and plan as documented by the resident.   GE modifier added to this encounter      Herman Bhandari DO 8/18/2023 10:26 AM
Prescribed:  No orders of the defined types were placed in this encounter. No future appointments. Patient given educational materials - see patient instructions. Discussed use, benefit, and sideeffects of prescribed medications. All patient questions answered. Pt voiced understanding. Reviewed health maintenance. Instructed to continue current medications, diet and exercise. Patient agreed with treatment plan. Follow up as directed.      Electronically signed by Marybeth Morrow MD on 8/18/2023 at 1:19 PM